# Patient Record
Sex: FEMALE | Race: WHITE | NOT HISPANIC OR LATINO | Employment: FULL TIME | URBAN - METROPOLITAN AREA
[De-identification: names, ages, dates, MRNs, and addresses within clinical notes are randomized per-mention and may not be internally consistent; named-entity substitution may affect disease eponyms.]

---

## 2017-06-02 ENCOUNTER — HOSPITAL ENCOUNTER (OUTPATIENT)
Dept: OTHER | Facility: HOSPITAL | Age: 52
Discharge: HOME | End: 2017-06-02
Attending: FAMILY MEDICINE | Admitting: FAMILY MEDICINE

## 2018-11-20 ENCOUNTER — HOSPITAL ENCOUNTER (OUTPATIENT)
Dept: OTHER | Facility: HOSPITAL | Age: 53
Discharge: HOME | End: 2018-11-20
Attending: FAMILY MEDICINE | Admitting: FAMILY MEDICINE

## 2019-11-12 ENCOUNTER — OFFICE VISIT (OUTPATIENT)
Dept: FAMILY MEDICINE CLINIC | Facility: CLINIC | Age: 54
End: 2019-11-12

## 2019-11-12 VITALS
SYSTOLIC BLOOD PRESSURE: 139 MMHG | WEIGHT: 124.6 LBS | DIASTOLIC BLOOD PRESSURE: 81 MMHG | TEMPERATURE: 98.5 F | HEIGHT: 62 IN | OXYGEN SATURATION: 98 % | RESPIRATION RATE: 18 BRPM | HEART RATE: 70 BPM | BODY MASS INDEX: 22.93 KG/M2

## 2019-11-12 DIAGNOSIS — F43.9 SITUATIONAL STRESS: ICD-10-CM

## 2019-11-12 DIAGNOSIS — Z12.4 PAP SMEAR FOR CERVICAL CANCER SCREENING: ICD-10-CM

## 2019-11-12 DIAGNOSIS — I34.0 NONRHEUMATIC MITRAL VALVE REGURGITATION: ICD-10-CM

## 2019-11-12 DIAGNOSIS — N60.19 FIBROCYSTIC BREAST DISEASE (FCBD), UNSPECIFIED LATERALITY: ICD-10-CM

## 2019-11-12 DIAGNOSIS — N95.1 MENOPAUSAL SYNDROME: ICD-10-CM

## 2019-11-12 DIAGNOSIS — R87.610 ASCUS OF CERVIX WITH NEGATIVE HIGH RISK HPV: ICD-10-CM

## 2019-11-12 DIAGNOSIS — N95.1 PERIMENOPAUSAL: ICD-10-CM

## 2019-11-12 DIAGNOSIS — Z00.00 ENCOUNTER FOR ROUTINE HISTORY AND PHYSICAL EXAM IN FEMALE: ICD-10-CM

## 2019-11-12 DIAGNOSIS — E78.2 HYPERLIPEMIA, MIXED: ICD-10-CM

## 2019-11-12 DIAGNOSIS — N72 CERVICAL INFLAMMATION: ICD-10-CM

## 2019-11-12 DIAGNOSIS — M85.80 OSTEOPENIA, UNSPECIFIED LOCATION: ICD-10-CM

## 2019-11-12 DIAGNOSIS — Z12.31 VISIT FOR SCREENING MAMMOGRAM: ICD-10-CM

## 2019-11-12 DIAGNOSIS — N92.6 IRREGULAR MENSES: ICD-10-CM

## 2019-11-12 DIAGNOSIS — I78.1 SPIDER VEINS: ICD-10-CM

## 2019-11-12 DIAGNOSIS — K21.9 GASTROESOPHAGEAL REFLUX DISEASE, ESOPHAGITIS PRESENCE NOT SPECIFIED: ICD-10-CM

## 2019-11-12 DIAGNOSIS — Z82.49 FAMILY HISTORY OF BLOOD CLOTS: ICD-10-CM

## 2019-11-12 DIAGNOSIS — K58.1 IRRITABLE BOWEL SYNDROME WITH CONSTIPATION: Primary | ICD-10-CM

## 2019-11-12 DIAGNOSIS — K59.00 CONSTIPATION, UNSPECIFIED CONSTIPATION TYPE: ICD-10-CM

## 2019-11-12 PROBLEM — K58.9 IRRITABLE BOWEL DISEASE: Status: ACTIVE | Noted: 2019-11-12

## 2019-11-12 PROCEDURE — 99214 OFFICE O/P EST MOD 30 MIN: CPT | Performed by: FAMILY MEDICINE

## 2019-11-12 PROCEDURE — 99396 PREV VISIT EST AGE 40-64: CPT | Performed by: FAMILY MEDICINE

## 2019-11-12 RX ORDER — PHENOL 1.4 %
600 AEROSOL, SPRAY (ML) MUCOUS MEMBRANE DAILY
COMMUNITY
End: 2020-02-04

## 2019-11-12 RX ORDER — ASCORBIC ACID 500 MG
500 TABLET ORAL DAILY
COMMUNITY

## 2019-11-12 NOTE — ASSESSMENT & PLAN NOTE
Encouraged to do self breast and self derm exams.  Congratulated on using seat belts.  Encouraged to do annual physical exams. Immunizations status reviewed.

## 2019-11-12 NOTE — ASSESSMENT & PLAN NOTE
Labs done 3-4-19, read by me, reviewed with pt.  Trig. 76 down from 77, Tot. Chol. 195 up from 192, HDL 89 up from 79, LDL 89 down from 98. Stable. Advised Ct calcium screening, pt. Given information to schedule with [facility].  Encouraged to watch fatty intake, exercise more, and lose weight. N  No medication  Is getting adequate diet and exercise  Goals developed at last visit were met   Follow up in 12 months  Care management needs are self-addressed.  Self-management abilities addressed and patient is capable of managing her own disease.

## 2019-11-12 NOTE — PROGRESS NOTES
Subjective   Massiel Borja is a 54 y.o. female here for her annual physical with me. Massiel is here for coordination of medical care, to discuss health maintenance, disease prevention as well as to followup on medical problems. Patient has been followed by me since . Patient's last CPE was 11-5-18. Activity level is minimal. Exercises 0 per week. Appetite is good. Feels fairly well with few complaints. Energy level is good. Sleeps fairly well. Patient's last colonoscopy was 10-2-17. She is advised to repeat in 10 years. Patient's last mammogram was 11-20-18. Patient is doing routine self skin exam monthly. Patient is doing routine self-breast exams monthly.  Last Pap 11-5-18    Menstrual Problem   This is a chronic problem. The current episode started more than 1 year ago. The problem occurs constantly. Pertinent negatives include no abdominal pain, chest pain, chills, congestion, coughing, fatigue, fever, joint swelling, myalgias, nausea, neck pain, rash, sore throat, vomiting or weakness. Nothing aggravates the symptoms. She has tried nothing for the symptoms. The treatment provided no relief.   Heartburn   She reports no abdominal pain, no chest pain, no coughing, no nausea, no sore throat or no wheezing. This is a chronic problem. The current episode started more than 1 year ago. The problem occurs occasionally. The problem has been unchanged. The symptoms are aggravated by certain foods. Pertinent negatives include no fatigue or weight loss. There are no known risk factors. She has tried nothing for the symptoms. The treatment provided moderate relief.   Hyperlipidemia   This is a chronic problem. The current episode started more than 1 year ago. The problem is controlled. Recent lipid tests were reviewed and are normal. Factors aggravating her hyperlipidemia include fatty foods. Pertinent negatives include no chest pain, myalgias or shortness of breath. She is currently on no antihyperlipidemic treatment.  The current treatment provides significant improvement of lipids. There are no compliance problems.  Risk factors for coronary artery disease include dyslipidemia.   Constipation   This is a chronic problem. The current episode started more than 1 year ago. The problem is unchanged. The patient is not on a high fiber diet. She exercises regularly. There has been adequate water intake. Pertinent negatives include no abdominal pain, back pain, diarrhea, difficulty urinating, fever, nausea, rectal pain, vomiting or weight loss. She has tried fiber for the symptoms. The treatment provided significant relief.        The following portions of the patient's history were reviewed and updated as appropriate: allergies, current medications, past family history, past medical history, past social history, past surgical history and problem list.    Past Medical History:   Diagnosis Date   • Atypical squamous cell changes of undetermined significance (ASCUS) on cervical cytology with negative high risk human papilloma virus (HPV) test result    • GERD (gastroesophageal reflux disease)    • Hyperlipidemia    • Irritable bowel syndrome    • Osteopenia        Family History   Problem Relation Age of Onset   • COPD Mother    • Cancer Father         lung, kidney,   • Other Brother         brother and father-blood clots   • Heart disease Brother    • Stroke Brother    • Diabetes Maternal Aunt    • Diabetes Maternal Uncle    • Stroke Paternal Uncle          at 70   • Other Maternal Grandmother         alzheimers and parkinsons   • Stroke Maternal Grandfather    • Other Paternal Grandfather         alzheimers       Social History     Socioeconomic History   • Marital status: Single     Spouse name: Not on file   • Number of children: Not on file   • Years of education: Not on file   • Highest education level: Not on file   Tobacco Use   • Smoking status: Never Smoker   • Smokeless tobacco: Never Used   Substance and Sexual  Activity   • Alcohol use: Yes     Frequency: 4 or more times a week     Drinks per session: 1 or 2     Binge frequency: Never   • Drug use: No   • Sexual activity: No   Social History Narrative    2 cups of coffee daily.  Not dating.       Past Surgical History:   Procedure Laterality Date   • CERVIX LESION DESTRUCTION  1992   • TUBAL ABDOMINAL LIGATION         Current Outpatient Medications on File Prior to Visit   Medication Sig Dispense Refill   • calcium carbonate (OS-LU) 600 MG tablet Take 600 mg by mouth Daily.     • Cholecalciferol (VITAMIN D3) 125 MCG (5000 UT) capsule capsule Take 5,000 Units by mouth Daily.     • vitamin C (ASCORBIC ACID) 500 MG tablet Take 500 mg by mouth Daily.       No current facility-administered medications on file prior to visit.        No Known Allergies    Review of Systems   Constitutional: Negative for appetite change, chills, fatigue, fever, unexpected weight gain and unexpected weight loss.   HENT: Negative for congestion, dental problem, ear discharge, ear pain, hearing loss, nosebleeds, postnasal drip, rhinorrhea, sinus pressure, sneezing, sore throat, tinnitus and voice change.         Last dental exam-11-8-19, Dr. Yu-doing well   Eyes: Negative for blurred vision, double vision, pain, redness and visual disturbance.        Last eye exam Approx. 2014-advised to follow   Respiratory: Negative for cough, shortness of breath, wheezing and stridor.    Cardiovascular: Negative for chest pain, palpitations and leg swelling.   Gastrointestinal: Positive for constipation and GERD. Negative for abdominal pain, anal bleeding, blood in stool, diarrhea, nausea, rectal pain, vomiting and indigestion.        4 BM weekly   Endocrine: Negative for cold intolerance, heat intolerance, polydipsia, polyphagia and polyuria.   Genitourinary: Positive for menstrual problem. Negative for difficulty urinating, dysuria, frequency, hematuria and urgency.   Musculoskeletal: Negative for back  "pain, joint swelling, myalgias, neck pain and neck stiffness.   Skin: Negative for color change, dry skin and rash.   Neurological: Negative for dizziness, syncope, speech difficulty, weakness, light-headedness, headache and memory problem.   Hematological: Does not bruise/bleed easily.   Psychiatric/Behavioral: Negative for decreased concentration, sleep disturbance, depressed mood and stress. The patient is not nervous/anxious.        Objective   Visit Vitals  /81 (BP Location: Left arm, Patient Position: Sitting, Cuff Size: Adult)   Pulse 70   Temp 98.5 °F (36.9 °C) (Oral)   Resp 18   Ht 156.2 cm (61.5\")   Wt 56.5 kg (124 lb 9.6 oz)   SpO2 98%   BMI 23.16 kg/m²     Physical Exam   Constitutional: She is oriented to person, place, and time. She appears well-developed and well-nourished. No distress.   HENT:   Head: Normocephalic and atraumatic.   Right Ear: Hearing, tympanic membrane, external ear and ear canal normal.   Left Ear: Hearing, tympanic membrane, external ear and ear canal normal.   Nose: Nose normal.   Mouth/Throat: Oropharynx is clear and moist and mucous membranes are normal. No oropharyngeal exudate.   Eyes: Conjunctivae, EOM and lids are normal. Pupils are equal, round, and reactive to light. Right eye exhibits no discharge. Left eye exhibits no discharge. No scleral icterus.   Neck: Normal range of motion and full passive range of motion without pain. Neck supple. No thyromegaly present.   Cardiovascular: Normal rate, regular rhythm and intact distal pulses.   No murmur heard.  Pulses:       Dorsalis pedis pulses are 1+ on the right side, and 1+ on the left side.        Posterior tibial pulses are 0 on the right side, and 0 on the left side.   Pulmonary/Chest: Effort normal and breath sounds normal. No respiratory distress. She has no wheezes. She has no rales. She exhibits no tenderness.   Breast are nodular more on the right than the left   Abdominal: Soft. Bowel sounds are normal. She " exhibits no distension. There is no tenderness. There is no guarding. No hernia.   Genitourinary: Rectum normal, vagina normal and uterus normal. Pelvic exam was performed with patient supine. No vaginal discharge found.   Genitourinary Comments: Cervix irritated 2 bites taken at 7 and 11 o'clock.  Pap smear obtained.   Musculoskeletal: Normal range of motion. She exhibits no edema, tenderness or deformity.   Lymphadenopathy:     She has no cervical adenopathy.   Neurological: She is alert and oriented to person, place, and time. She has normal strength. No cranial nerve deficit or sensory deficit. She exhibits normal muscle tone. Coordination normal.   Skin: Skin is warm and dry. Lesion noted. No rash noted. She is not diaphoretic. No erythema.   Nevus of the left buttock.  Spider veins of the left thigh.   Psychiatric: She has a normal mood and affect. Her behavior is normal. Judgment and thought content normal.       Assessment/Plan   Problem List Items Addressed This Visit        Cardiovascular and Mediastinum    Hyperlipemia, mixed    Current Assessment & Plan     Labs done 3-4-19, read by me, reviewed with pt.  Trig. 76 down from 77, Tot. Chol. 195 up from 192, HDL 89 up from 79, LDL 89 down from 98. Stable. Advised Ct calcium screening, pt. Given information to schedule with [facility].  Encouraged to watch fatty intake, exercise more, and lose weight. N  No medication  Is getting adequate diet and exercise  Goals developed at last visit were met   Follow up in 12 months  Care management needs are self-addressed.  Self-management abilities addressed and patient is capable of managing her own disease.           Mitral insufficiency    Current Assessment & Plan     Pt. Declines Echo at present         Spider veins    Current Assessment & Plan     Stable            Digestive    Constipation    Current Assessment & Plan     Start Metamucil daily, start with 1 teaspoon and gradually work up to 2 tablespoons daily          GERD (gastroesophageal reflux disease)    Current Assessment & Plan     Doing well advised OTC Zantac or Prilosec         Irritable bowel disease - Primary    Current Assessment & Plan     Doing well at present            Musculoskeletal and Integument    Osteopenia    Current Assessment & Plan     Pt. Given order for Dexa scan, will call results to pt.         Relevant Orders    DEXA Bone Density Axial       Genitourinary    ASCUS of cervix with negative high risk HPV    Current Assessment & Plan     Advised yrly pap.  Pap obtained today         Cervical inflammation    Current Assessment & Plan     New dx.  2 bites taken with a mini watts at 7 and 11 o'clock         Relevant Orders    Reference Histopathology    Irregular menses    Current Assessment & Plan     Advised U/S, pt. Declines at present.         Perimenopausal    Current Assessment & Plan     New dx.  Advised Transvaginal U/S, pt. declines            Other    Encounter for routine history and physical exam in female    Overview     Medical records thoroughly reviewed and summarized in emr, since last PE           Current Assessment & Plan     Encouraged to do self breast and self derm exams.  Congratulated on using seat belts.  Encouraged to do annual physical exams. Immunizations status reviewed.           Family history of blood clots    Overview     Father and brother         Current Assessment & Plan     Offered hematologist referral, pt. Declines.         Fibrocystic breast disease    Current Assessment & Plan     Advised mammo and pt. Given order for mammo.         Menopausal syndrome    Current Assessment & Plan     Doing well         Pap smear for cervical cancer screening    Current Assessment & Plan     Pap obtained today.         Relevant Orders    Liquid-based Pap Smear, Diagnostic    RESOLVED: Situational stress    Visit for screening mammogram    Current Assessment & Plan     Pt. Given order for mammo.         Relevant Orders    Mammo  Screening Digital Tomosynthesis Bilateral With CAD               Encouraged to check her skin and breasts monthly. Reviewed exercising regularly, eating a balanced diet, having regular mammograms, immunizations and if due, patient counselled to check with insurance company for coverage;, importance of bone density screening and regularly checking skin and breasts.

## 2019-11-15 LAB
DX ICD CODE: NORMAL
PATH REPORT.FINAL DX SPEC: NORMAL
PATH REPORT.GROSS SPEC: NORMAL
PATH REPORT.RELEVANT HX SPEC: NORMAL
PATH REPORT.SITE OF ORIGIN SPEC: NORMAL
PATHOLOGIST NAME: NORMAL
PAYMENT PROCEDURE: NORMAL

## 2019-11-16 LAB
CONV .: NORMAL
CYTOLOGIST CVX/VAG CYTO: NORMAL
CYTOLOGY CVX/VAG DOC CYTO: NORMAL
HIV 1 & 2 AB SER-IMP: NORMAL
OTHER STN SPEC: NORMAL
STAT OF ADQ CVX/VAG CYTO-IMP: NORMAL

## 2019-11-20 ENCOUNTER — HOSPITAL ENCOUNTER (OUTPATIENT)
Dept: MAMMOGRAPHY | Facility: HOSPITAL | Age: 54
Discharge: HOME | End: 2019-11-20
Admitting: FAMILY MEDICINE

## 2019-11-20 ENCOUNTER — HOSPITAL ENCOUNTER (OUTPATIENT)
Dept: BONE DENSITY | Facility: HOSPITAL | Age: 54
Discharge: HOME | End: 2019-11-20

## 2019-11-20 DIAGNOSIS — Z12.31 VISIT FOR SCREENING MAMMOGRAM: ICD-10-CM

## 2019-11-20 DIAGNOSIS — M85.80 OSTEOPENIA, UNSPECIFIED LOCATION: ICD-10-CM

## 2019-11-20 PROCEDURE — 77063 BREAST TOMOSYNTHESIS BI: CPT

## 2019-11-20 PROCEDURE — 77080 DXA BONE DENSITY AXIAL: CPT

## 2019-11-20 PROCEDURE — 77067 SCR MAMMO BI INCL CAD: CPT

## 2019-11-27 DIAGNOSIS — N63.20 LEFT BREAST MASS: Primary | ICD-10-CM

## 2019-12-20 ENCOUNTER — HOSPITAL ENCOUNTER (OUTPATIENT)
Dept: ULTRASOUND IMAGING | Facility: HOSPITAL | Age: 54
Discharge: HOME | End: 2019-12-20

## 2019-12-20 ENCOUNTER — HOSPITAL ENCOUNTER (OUTPATIENT)
Dept: MAMMOGRAPHY | Facility: HOSPITAL | Age: 54
Discharge: HOME | End: 2019-12-20
Admitting: FAMILY MEDICINE

## 2019-12-20 DIAGNOSIS — N63.20 LEFT BREAST MASS: ICD-10-CM

## 2019-12-20 DIAGNOSIS — R92.8 ABNORMALITY OF LEFT BREAST ON SCREENING MAMMOGRAM: ICD-10-CM

## 2019-12-20 PROCEDURE — 76642 ULTRASOUND BREAST LIMITED: CPT

## 2019-12-20 PROCEDURE — G0279 TOMOSYNTHESIS, MAMMO: HCPCS

## 2019-12-20 PROCEDURE — 77065 DX MAMMO INCL CAD UNI: CPT

## 2020-01-30 ENCOUNTER — TELEPHONE (OUTPATIENT)
Dept: FAMILY MEDICINE CLINIC | Facility: CLINIC | Age: 55
End: 2020-01-30

## 2020-01-30 NOTE — TELEPHONE ENCOUNTER
Patient left a message that she thinks her leaky valve is acting up. She states that she is having some heart fluttering. I encouraged patient to go to the ER but she doesn't feel like she needs to go at this time.  Scheduled an appt with Dr. Lopez next week.

## 2020-02-04 ENCOUNTER — OFFICE VISIT (OUTPATIENT)
Dept: FAMILY MEDICINE CLINIC | Facility: CLINIC | Age: 55
End: 2020-02-04

## 2020-02-04 VITALS
RESPIRATION RATE: 18 BRPM | BODY MASS INDEX: 23.59 KG/M2 | HEIGHT: 62 IN | OXYGEN SATURATION: 99 % | TEMPERATURE: 98.6 F | WEIGHT: 128.2 LBS | HEART RATE: 83 BPM | SYSTOLIC BLOOD PRESSURE: 114 MMHG | DIASTOLIC BLOOD PRESSURE: 60 MMHG

## 2020-02-04 DIAGNOSIS — R07.89 OTHER CHEST PAIN: ICD-10-CM

## 2020-02-04 DIAGNOSIS — K21.9 GASTROESOPHAGEAL REFLUX DISEASE WITHOUT ESOPHAGITIS: ICD-10-CM

## 2020-02-04 DIAGNOSIS — R06.00 DYSPNEA, UNSPECIFIED TYPE: Primary | ICD-10-CM

## 2020-02-04 PROCEDURE — 93000 ELECTROCARDIOGRAM COMPLETE: CPT | Performed by: FAMILY MEDICINE

## 2020-02-04 PROCEDURE — 99214 OFFICE O/P EST MOD 30 MIN: CPT | Performed by: FAMILY MEDICINE

## 2020-02-04 RX ORDER — PANTOPRAZOLE SODIUM 40 MG/1
40 TABLET, DELAYED RELEASE ORAL DAILY
Qty: 30 TABLET | Refills: 5 | Status: SHIPPED | OUTPATIENT
Start: 2020-02-04 | End: 2023-01-09

## 2020-02-04 NOTE — ASSESSMENT & PLAN NOTE
Intermittent-   Probably due to costochondritis or mild arrythmia.  EKG done today and read by myself shows normal sinus rhythm with nonspecific changes.  Will schedule a stress echo.   If sx change or increase return Immediately

## 2020-02-04 NOTE — PROGRESS NOTES
Subjective   Massiel Borja is a 54 y.o. female.     Palpitations    This is a recurrent problem. The current episode started more than 1 month ago. The problem occurs intermittently. The problem has been waxing and waning. Associated symptoms include chest pain and shortness of breath. Pertinent negatives include no diaphoresis or nausea. She has tried nothing for the symptoms. mitral valve insufficiency   Chest Pain    This is a recurrent problem. The current episode started in the past 7 days. The onset quality is gradual. The problem has been gradually worsening. The pain is present in the epigastric region. The quality of the pain is described as burning. The pain does not radiate. Associated symptoms include palpitations and shortness of breath. Pertinent negatives include no diaphoresis or nausea. Past medical history comments: mitral valve insufficiency        The following portions of the patient's history were reviewed and updated as appropriate: allergies, current medications, past family history, past medical history, past social history, past surgical history and problem list.    Family History   Problem Relation Age of Onset   • COPD Mother    • Cancer Father         lung, kidney,   • Other Brother         brother and father-blood clots   • Heart disease Brother    • Stroke Brother    • Diabetes Maternal Aunt    • Diabetes Maternal Uncle    • Stroke Paternal Uncle          at 70   • Other Maternal Grandmother         alzheimers and parkinsons   • Stroke Maternal Grandfather    • Other Paternal Grandfather         alzheimers       Social History     Tobacco Use   • Smoking status: Never Smoker   • Smokeless tobacco: Never Used   Substance Use Topics   • Alcohol use: Yes     Frequency: 4 or more times a week     Drinks per session: 1 or 2     Binge frequency: Never   • Drug use: No       Past Surgical History:   Procedure Laterality Date   • CERVIX LESION DESTRUCTION     • TUBAL ABDOMINAL  "LIGATION         Patient Active Problem List   Diagnosis   • ASCUS of cervix with negative high risk HPV   • Perimenopausal   • Fibrocystic breast disease   • Hyperlipemia, mixed   • Mitral insufficiency   • Spider veins   • Menopausal syndrome   • Osteopenia   • Irritable bowel disease   • GERD (gastroesophageal reflux disease)   • Visit for screening mammogram   • Irregular menses   • Cervical inflammation   • Family history of blood clots   • Pap smear for cervical cancer screening   • Constipation   • Encounter for routine history and physical exam in female   • Dyspnea   • Other chest pain       Current Outpatient Medications on File Prior to Visit   Medication Sig Dispense Refill   • Cholecalciferol (VITAMIN D3) 125 MCG (5000 UT) capsule capsule Take 5,000 Units by mouth Daily.     • vitamin C (ASCORBIC ACID) 500 MG tablet Take 500 mg by mouth Daily.       No current facility-administered medications on file prior to visit.        No Known Allergies    Review of Systems   Constitutional: Negative for diaphoresis.   Respiratory: Positive for shortness of breath. Negative for wheezing.    Cardiovascular: Positive for chest pain and palpitations.   Gastrointestinal: Positive for GERD. Negative for nausea.       Objective   Visit Vitals  /60 (BP Location: Right arm, Patient Position: Sitting, Cuff Size: Adult)   Pulse 83   Temp 98.6 °F (37 °C) (Oral)   Resp 18   Ht 156.2 cm (61.5\")   Wt 58.2 kg (128 lb 3.2 oz)   SpO2 99%   BMI 23.83 kg/m²     Physical Exam   Constitutional: She is oriented to person, place, and time. She appears well-developed and well-nourished. She is cooperative.   HENT:   Head: Normocephalic.   Neck: Trachea normal. Neck supple. Carotid bruit is not present. No thyromegaly present.   Cardiovascular: Normal rate and regular rhythm. Exam reveals no gallop and no friction rub.   No murmur heard.  Pulmonary/Chest: Effort normal and breath sounds normal. No respiratory distress. She has no " wheezes. She exhibits no tenderness.   Neurological: She is alert and oriented to person, place, and time.   Skin: Skin is dry. No rash noted. Nails show no clubbing.   Nursing note and vitals reviewed.        Assessment/Plan .  Problem List Items Addressed This Visit        Unprioritized    Dyspnea - Primary    Current Assessment & Plan     Intermittent- will order a chest xray. If no improvement will need pulm func         Relevant Orders    Adult Stress Echo W/ Cont or Stress Agent if Necessary Per Protocol    GERD (gastroesophageal reflux disease)    Current Assessment & Plan     Worsening- Advised patient to try Protonix 40mg daily.          Relevant Medications    pantoprazole (PROTONIX) 40 MG EC tablet    Other chest pain    Current Assessment & Plan     Intermittent-   Probably due to costochondritis or mild arrythmia.  EKG done today and read by myself shows normal sinus rhythm with nonspecific changes.  Will schedule a stress echo.   If sx change or increase return Immediately         Relevant Orders    ECG 12 Lead    Adult Stress Echo W/ Cont or Stress Agent if Necessary Per Protocol

## 2020-02-13 ENCOUNTER — APPOINTMENT (OUTPATIENT)
Dept: CARDIOLOGY | Facility: HOSPITAL | Age: 55
End: 2020-02-13

## 2020-02-27 ENCOUNTER — OFFICE VISIT (OUTPATIENT)
Dept: FAMILY MEDICINE CLINIC | Facility: CLINIC | Age: 55
End: 2020-02-27

## 2020-02-27 ENCOUNTER — HOSPITAL ENCOUNTER (OUTPATIENT)
Dept: CARDIOLOGY | Facility: HOSPITAL | Age: 55
Discharge: HOME | End: 2020-02-27
Admitting: FAMILY MEDICINE

## 2020-02-27 VITALS — HEART RATE: 62 BPM | DIASTOLIC BLOOD PRESSURE: 80 MMHG | SYSTOLIC BLOOD PRESSURE: 136 MMHG

## 2020-02-27 VITALS — HEIGHT: 61 IN | WEIGHT: 128 LBS | BODY MASS INDEX: 24.17 KG/M2

## 2020-02-27 DIAGNOSIS — R06.00 DYSPNEA, UNSPECIFIED TYPE: ICD-10-CM

## 2020-02-27 DIAGNOSIS — R07.89 OTHER CHEST PAIN: Primary | ICD-10-CM

## 2020-02-27 DIAGNOSIS — R07.89 OTHER CHEST PAIN: ICD-10-CM

## 2020-02-27 DIAGNOSIS — R00.2 PALPITATIONS: ICD-10-CM

## 2020-02-27 PROCEDURE — 99213 OFFICE O/P EST LOW 20 MIN: CPT | Performed by: FAMILY MEDICINE

## 2020-02-27 PROCEDURE — 93350 STRESS TTE ONLY: CPT

## 2020-02-27 PROCEDURE — 93320 DOPPLER ECHO COMPLETE: CPT

## 2020-02-27 PROCEDURE — 93325 DOPPLER ECHO COLOR FLOW MAPG: CPT

## 2020-02-27 PROCEDURE — 93017 CV STRESS TEST TRACING ONLY: CPT

## 2020-02-27 NOTE — ASSESSMENT & PLAN NOTE
Improved, not resolved.  Stress Echo done today. Stress portion appears normal.   Pt. Will return in 1 week to discuss official results.

## 2020-02-27 NOTE — PROGRESS NOTES
Subjective   Massiel Borja is a 54 y.o. female.     Pt in office to discuss chest pain, palp and stress test done in diagnostics    Chest Pain    This is a recurrent problem. The current episode started in the past 7 days. The onset quality is gradual. The problem has been gradually improving. The pain is present in the epigastric region. The quality of the pain is described as burning. The pain does not radiate. Associated symptoms include weakness. Pertinent negatives include no diaphoresis, fever, nausea, shortness of breath or vomiting. Palpitations: improved. Past medical history comments: mitral valve insufficiency   Palpitations    This is a recurrent problem. The current episode started more than 1 month ago. The problem occurs intermittently. The problem has been gradually improving. Associated symptoms include chest pain (improved) and weakness. Pertinent negatives include no diaphoresis, fever, nausea, shortness of breath or vomiting. She has tried nothing for the symptoms. mitral valve insufficiency        The following portions of the patient's history were reviewed and updated as appropriate: allergies, current medications, past family history, past medical history, past social history, past surgical history and problem list.    Patient Active Problem List   Diagnosis   • ASCUS of cervix with negative high risk HPV   • Perimenopausal   • Fibrocystic breast disease   • Hyperlipemia, mixed   • Mitral insufficiency   • Spider veins   • Menopausal syndrome   • Osteopenia   • Irritable bowel disease   • GERD (gastroesophageal reflux disease)   • Visit for screening mammogram   • Irregular menses   • Cervical inflammation   • Family history of blood clots   • Pap smear for cervical cancer screening   • Constipation   • Encounter for routine history and physical exam in female   • Dyspnea   • Other chest pain   • Palpitations       Current Outpatient Medications on File Prior to Visit   Medication Sig Dispense  Refill   • Cholecalciferol (VITAMIN D3) 125 MCG (5000 UT) capsule capsule Take 5,000 Units by mouth Daily.     • pantoprazole (PROTONIX) 40 MG EC tablet Take 1 tablet by mouth Daily. 30 tablet 5   • vitamin C (ASCORBIC ACID) 500 MG tablet Take 500 mg by mouth Daily.       No current facility-administered medications on file prior to visit.      Current outpatient and discharge medications have been reconciled for the patient.  Reviewed by: Lenin Lopez MD      No Known Allergies    Review of Systems   Constitutional: Negative for diaphoresis, fatigue and fever.   Respiratory: Negative for shortness of breath.    Cardiovascular: Positive for chest pain (improved). Palpitations: improved.   Gastrointestinal: Negative for nausea and vomiting.   Neurological: Positive for weakness.     I have reviewed and confirmed the accuracy of the ROS as documented by the MA/LPN/RN Lenin Lopez MD      Objective   Vitals:    02/27/20 1554   BP: 136/80   Pulse: 62     Physical Exam   Constitutional: She is oriented to person, place, and time. She appears well-developed and well-nourished. She is cooperative.   HENT:   Head: Normocephalic.   Neck: Trachea normal. Neck supple. Carotid bruit is not present. No thyromegaly present.   Cardiovascular: Normal rate and regular rhythm. Exam reveals no gallop and no friction rub.   No murmur heard.  Pulmonary/Chest: Effort normal and breath sounds normal. No respiratory distress. She has no wheezes. She exhibits no tenderness.   Neurological: She is alert and oriented to person, place, and time.   Skin: Skin is dry. No rash noted. Nails show no clubbing.   Nursing note and vitals reviewed.        Assessment/Plan .  Problem List Items Addressed This Visit        Unprioritized    Other chest pain - Primary    Current Assessment & Plan     Improved, not resolved.  Stress Echo done today. Stress portion appears normal.   Pt. Will return in 1 week to discuss official results.          Palpitations    Current Assessment & Plan     Improved, not resolved.  Stress Echo done today. Stress portion appears normal.   Pt. Will return in 1 week to discuss official results.

## 2020-03-03 LAB
BH CV ECHO MEAS - ACS: 1.5 CM
BH CV ECHO MEAS - AO MAX PG (FULL): 2.1 MMHG
BH CV ECHO MEAS - AO MAX PG: 6.5 MMHG
BH CV ECHO MEAS - AO MEAN PG (FULL): 0.77 MMHG
BH CV ECHO MEAS - AO MEAN PG: 3 MMHG
BH CV ECHO MEAS - AO ROOT AREA (BSA CORRECTED): 1.6
BH CV ECHO MEAS - AO ROOT AREA: 4.8 CM^2
BH CV ECHO MEAS - AO ROOT DIAM: 2.5 CM
BH CV ECHO MEAS - AO V2 MAX: 127.6 CM/SEC
BH CV ECHO MEAS - AO V2 MEAN: 80.4 CM/SEC
BH CV ECHO MEAS - AO V2 VTI: 27.5 CM
BH CV ECHO MEAS - AVA(I,A): 2.5 CM^2
BH CV ECHO MEAS - AVA(I,D): 2.5 CM^2
BH CV ECHO MEAS - AVA(V,A): 2.5 CM^2
BH CV ECHO MEAS - AVA(V,D): 2.5 CM^2
BH CV ECHO MEAS - BSA(HAYCOCK): 1.6 M^2
BH CV ECHO MEAS - BSA: 1.6 M^2
BH CV ECHO MEAS - BZI_BMI: 23.8 KILOGRAMS/M^2
BH CV ECHO MEAS - BZI_METRIC_HEIGHT: 156.2 CM
BH CV ECHO MEAS - BZI_METRIC_WEIGHT: 58.1 KG
BH CV ECHO MEAS - EDV(CUBED): 80.3 ML
BH CV ECHO MEAS - EDV(MOD-SP4): 72.4 ML
BH CV ECHO MEAS - EDV(TEICH): 83.7 ML
BH CV ECHO MEAS - EF(CUBED): 73.9 %
BH CV ECHO MEAS - EF(MOD-BP): 65 %
BH CV ECHO MEAS - EF(MOD-SP4): 67.8 %
BH CV ECHO MEAS - EF(TEICH): 66.1 %
BH CV ECHO MEAS - ESV(CUBED): 20.9 ML
BH CV ECHO MEAS - ESV(MOD-SP4): 23.3 ML
BH CV ECHO MEAS - ESV(TEICH): 28.4 ML
BH CV ECHO MEAS - FS: 36.1 %
BH CV ECHO MEAS - IVS/LVPW: 1
BH CV ECHO MEAS - IVSD: 0.88 CM
BH CV ECHO MEAS - LA DIMENSION: 2.3 CM
BH CV ECHO MEAS - LA/AO: 0.94
BH CV ECHO MEAS - LV DIASTOLIC VOL/BSA (35-75): 46.1 ML/M^2
BH CV ECHO MEAS - LV MASS(C)D: 120.2 GRAMS
BH CV ECHO MEAS - LV MASS(C)DI: 76.5 GRAMS/M^2
BH CV ECHO MEAS - LV MAX PG: 4.4 MMHG
BH CV ECHO MEAS - LV MEAN PG: 2.2 MMHG
BH CV ECHO MEAS - LV SYSTOLIC VOL/BSA (12-30): 14.8 ML/M^2
BH CV ECHO MEAS - LV V1 MAX: 105 CM/SEC
BH CV ECHO MEAS - LV V1 MEAN: 69.2 CM/SEC
BH CV ECHO MEAS - LV V1 VTI: 22.7 CM
BH CV ECHO MEAS - LVIDD: 4.3 CM
BH CV ECHO MEAS - LVIDS: 2.8 CM
BH CV ECHO MEAS - LVOT AREA: 3 CM^2
BH CV ECHO MEAS - LVOT DIAM: 2 CM
BH CV ECHO MEAS - LVPWD: 0.88 CM
BH CV ECHO MEAS - MR MAX PG: 127.4 MMHG
BH CV ECHO MEAS - MR MAX VEL: 564.2 CM/SEC
BH CV ECHO MEAS - MV A MAX VEL: 60.5 CM/SEC
BH CV ECHO MEAS - MV E MAX VEL: 80.1 CM/SEC
BH CV ECHO MEAS - MV E/A: 1.3
BH CV ECHO MEAS - MV MAX PG: 3.7 MMHG
BH CV ECHO MEAS - MV MEAN PG: 1.1 MMHG
BH CV ECHO MEAS - MV V2 MAX: 96.4 CM/SEC
BH CV ECHO MEAS - MV V2 MEAN: 48.3 CM/SEC
BH CV ECHO MEAS - MV V2 VTI: 25.9 CM
BH CV ECHO MEAS - MVA(VTI): 2.6 CM^2
BH CV ECHO MEAS - PA ACC TIME: 0.11 SEC
BH CV ECHO MEAS - PA MAX PG: 8.7 MMHG
BH CV ECHO MEAS - PA PR(ACCEL): 30.2 MMHG
BH CV ECHO MEAS - PA V2 MAX: 147.3 CM/SEC
BH CV ECHO MEAS - PI END-D VEL: 107.8 CM/SEC
BH CV ECHO MEAS - RAP SYSTOLE: 10 MMHG
BH CV ECHO MEAS - RVDD: 1.6 CM
BH CV ECHO MEAS - RVSP: 38.4 MMHG
BH CV ECHO MEAS - SI(AO): 83.2 ML/M^2
BH CV ECHO MEAS - SI(CUBED): 37.8 ML/M^2
BH CV ECHO MEAS - SI(LVOT): 43.4 ML/M^2
BH CV ECHO MEAS - SI(MOD-SP4): 31.2 ML/M^2
BH CV ECHO MEAS - SI(TEICH): 35.2 ML/M^2
BH CV ECHO MEAS - SV(AO): 130.8 ML
BH CV ECHO MEAS - SV(CUBED): 59.4 ML
BH CV ECHO MEAS - SV(LVOT): 68.2 ML
BH CV ECHO MEAS - SV(MOD-SP4): 49.1 ML
BH CV ECHO MEAS - SV(TEICH): 55.3 ML
BH CV ECHO MEAS - TR MAX VEL: 227.5 CM/SEC
BH CV STRESS BP STAGE 1: NORMAL
BH CV STRESS BP STAGE 2: NORMAL
BH CV STRESS BP STAGE 3: NORMAL
BH CV STRESS BP STAGE 4: NORMAL
BH CV STRESS DURATION MIN STAGE 1: 3
BH CV STRESS DURATION MIN STAGE 2: 3
BH CV STRESS DURATION MIN STAGE 3: 3
BH CV STRESS DURATION MIN STAGE 4: 3
BH CV STRESS DURATION SEC STAGE 1: 0
BH CV STRESS DURATION SEC STAGE 2: 0
BH CV STRESS DURATION SEC STAGE 3: 0
BH CV STRESS DURATION SEC STAGE 4: 0
BH CV STRESS ECHO POST STRESS EJECTION FRACTION EF: 75 %
BH CV STRESS GRADE STAGE 1: 10
BH CV STRESS GRADE STAGE 2: 12
BH CV STRESS GRADE STAGE 3: 14
BH CV STRESS GRADE STAGE 4: 16
BH CV STRESS HR STAGE 1: 109
BH CV STRESS HR STAGE 2: 122
BH CV STRESS HR STAGE 3: 139
BH CV STRESS HR STAGE 4: 173
BH CV STRESS METS STAGE 1: 5
BH CV STRESS METS STAGE 2: 7.5
BH CV STRESS METS STAGE 3: 10
BH CV STRESS METS STAGE 4: 13.5
BH CV STRESS PROTOCOL 1: NORMAL
BH CV STRESS RECOVERY BP: NORMAL MMHG
BH CV STRESS RECOVERY HR: 91 BPM
BH CV STRESS SPEED STAGE 1: 1.7
BH CV STRESS SPEED STAGE 2: 2.5
BH CV STRESS SPEED STAGE 3: 3.4
BH CV STRESS SPEED STAGE 4: 4.2
BH CV STRESS STAGE 1: 1
BH CV STRESS STAGE 2: 2
BH CV STRESS STAGE 3: 3
BH CV STRESS STAGE 4: 4
MAXIMAL PREDICTED HEART RATE: 168 BPM
PERCENT MAX PREDICTED HR: 103.57 %
STRESS BASELINE BP: NORMAL MMHG
STRESS BASELINE HR: 62 BPM
STRESS PERCENT HR: 122 %
STRESS POST EXERCISE DUR MIN: 12 MIN
STRESS POST EXERCISE DUR SEC: 4 SEC
STRESS POST PEAK BP: NORMAL MMHG
STRESS POST PEAK HR: 174 BPM
STRESS TARGET HR: 143 BPM

## 2020-03-03 PROCEDURE — 93320 DOPPLER ECHO COMPLETE: CPT | Performed by: INTERNAL MEDICINE

## 2020-03-03 PROCEDURE — 93018 CV STRESS TEST I&R ONLY: CPT | Performed by: INTERNAL MEDICINE

## 2020-03-03 PROCEDURE — 93325 DOPPLER ECHO COLOR FLOW MAPG: CPT | Performed by: INTERNAL MEDICINE

## 2020-03-03 PROCEDURE — 93350 STRESS TTE ONLY: CPT | Performed by: INTERNAL MEDICINE

## 2020-03-05 ENCOUNTER — OFFICE VISIT (OUTPATIENT)
Dept: FAMILY MEDICINE CLINIC | Facility: CLINIC | Age: 55
End: 2020-03-05

## 2020-03-05 VITALS
HEART RATE: 67 BPM | WEIGHT: 126.2 LBS | SYSTOLIC BLOOD PRESSURE: 122 MMHG | BODY MASS INDEX: 23.22 KG/M2 | OXYGEN SATURATION: 99 % | TEMPERATURE: 98.6 F | RESPIRATION RATE: 16 BRPM | DIASTOLIC BLOOD PRESSURE: 62 MMHG | HEIGHT: 62 IN

## 2020-03-05 DIAGNOSIS — R00.2 PALPITATIONS: ICD-10-CM

## 2020-03-05 DIAGNOSIS — R07.89 OTHER CHEST PAIN: ICD-10-CM

## 2020-03-05 DIAGNOSIS — R06.00 DYSPNEA, UNSPECIFIED TYPE: Primary | ICD-10-CM

## 2020-03-05 DIAGNOSIS — K21.9 GASTROESOPHAGEAL REFLUX DISEASE, ESOPHAGITIS PRESENCE NOT SPECIFIED: ICD-10-CM

## 2020-03-05 PROCEDURE — 99214 OFFICE O/P EST MOD 30 MIN: CPT | Performed by: FAMILY MEDICINE

## 2020-03-05 NOTE — PROGRESS NOTES
Subjective   Massiel Borja is a 54 y.o. female.     Follow up Diagnostic study:  Massiel was seen on 2020 for and Echocardiogram. She is here today to discuss the results.  Chest Pain    This is a recurrent problem. The current episode started more than 1 year ago. The onset quality is gradual. The problem occurs intermittently. The problem has been gradually improving. The pain is at a severity of 1/10. The quality of the pain is described as tightness. Associated symptoms include palpitations. Pertinent negatives include no diaphoresis, dizziness, nausea, numbness, shortness of breath or weakness.   Palpitations    This is a recurrent problem. The current episode started more than 1 year ago. The problem occurs intermittently. The problem has been gradually improving. The symptoms are aggravated by caffeine. Associated symptoms include chest pain. Pertinent negatives include no anxiety, diaphoresis, dizziness, nausea, numbness, shortness of breath or weakness.   Heartburn   She complains of chest pain and heartburn. She reports no dysphagia or no nausea. This is a recurrent problem. The current episode started more than 1 year ago. The problem occurs occasionally. The problem has been rapidly improving (improved with proton pump). The heartburn duration is several minutes.        The following portions of the patient's history were reviewed and updated as appropriate: allergies, current medications, past family history, past medical history, past social history, past surgical history and problem list.    Family History   Problem Relation Age of Onset   • COPD Mother    • Cancer Father         lung, kidney,   • Other Brother         brother and father-blood clots   • Heart disease Brother    • Stroke Brother    • Diabetes Maternal Aunt    • Diabetes Maternal Uncle    • Stroke Paternal Uncle          at 70   • Other Maternal Grandmother         alzheimers and parkinsons   • Stroke Maternal Grandfather     • Other Paternal Grandfather         alzheimers       Social History     Tobacco Use   • Smoking status: Never Smoker   • Smokeless tobacco: Never Used   Substance Use Topics   • Alcohol use: Yes     Frequency: 4 or more times a week     Drinks per session: 1 or 2     Binge frequency: Never   • Drug use: No       Past Surgical History:   Procedure Laterality Date   • CERVIX LESION DESTRUCTION  1992   • TUBAL ABDOMINAL LIGATION         Patient Active Problem List   Diagnosis   • ASCUS of cervix with negative high risk HPV   • Perimenopausal   • Fibrocystic breast disease   • Hyperlipemia, mixed   • Mitral insufficiency   • Spider veins   • Menopausal syndrome   • Osteopenia   • Irritable bowel disease   • GERD (gastroesophageal reflux disease)   • Visit for screening mammogram   • Irregular menses   • Cervical inflammation   • Family history of blood clots   • Pap smear for cervical cancer screening   • Constipation   • Encounter for routine history and physical exam in female   • Dyspnea   • Other chest pain   • Palpitations       Current Outpatient Medications on File Prior to Visit   Medication Sig Dispense Refill   • Cholecalciferol (VITAMIN D3) 125 MCG (5000 UT) capsule capsule Take 5,000 Units by mouth Daily.     • vitamin C (ASCORBIC ACID) 500 MG tablet Take 500 mg by mouth Daily.     • pantoprazole (PROTONIX) 40 MG EC tablet Take 1 tablet by mouth Daily. 30 tablet 5     No current facility-administered medications on file prior to visit.        No Known Allergies    Review of Systems   Constitutional: Negative for diaphoresis.   Respiratory: Negative for shortness of breath.    Cardiovascular: Positive for chest pain and palpitations.   Gastrointestinal: Negative for dysphagia and nausea.   Neurological: Negative for dizziness, syncope, weakness, light-headedness and numbness.   Psychiatric/Behavioral: The patient is not nervous/anxious.        Objective   Visit Vitals  /62 (BP Location: Right arm,  "Patient Position: Sitting, Cuff Size: Adult)   Pulse 67   Temp 98.6 °F (37 °C) (Oral)   Resp 16   Ht 156.2 cm (61.5\")   Wt 57.2 kg (126 lb 3.2 oz)   SpO2 99% Comment: Room air   BMI 23.46 kg/m²     Physical Exam   Constitutional: She is oriented to person, place, and time. She appears well-developed and well-nourished. She is cooperative.   HENT:   Head: Normocephalic.   Neck: Trachea normal. Neck supple. Carotid bruit is not present. No thyromegaly present.   Cardiovascular: Normal rate and regular rhythm. Exam reveals no gallop and no friction rub.   No murmur heard.  Pulmonary/Chest: Effort normal and breath sounds normal. No respiratory distress. She has no wheezes. She exhibits no tenderness.   Neurological: She is alert and oriented to person, place, and time.   Skin: Skin is dry. No rash noted. Nails show no clubbing.   Nursing note and vitals reviewed.        Assessment/Plan .  Problem List Items Addressed This Visit        Unprioritized    Dyspnea - Primary    Current Assessment & Plan     resolved         GERD (gastroesophageal reflux disease)    Current Assessment & Plan     Insurance would not pay for protonix thus she is using pepcid which does excellent.  She also feels this was causing her chest pain         Other chest pain    Current Assessment & Plan     ? Due to gerd Greatly improved with protonix -- advised stress echo was nl         Palpitations    Current Assessment & Plan     Greatly improved - stress echo is nl                    "

## 2020-03-06 NOTE — ASSESSMENT & PLAN NOTE
Insurance would not pay for protonix thus she is using pepcid which does excellent.  She also feels this was causing her chest pain

## 2020-06-08 DIAGNOSIS — N63.20 LEFT BREAST MASS: Primary | ICD-10-CM

## 2020-08-24 ENCOUNTER — TELEPHONE (OUTPATIENT)
Dept: FAMILY MEDICINE CLINIC | Facility: CLINIC | Age: 55
End: 2020-08-24

## 2020-09-03 DIAGNOSIS — N63.20 LEFT BREAST MASS: Primary | ICD-10-CM

## 2020-09-24 ENCOUNTER — HOSPITAL ENCOUNTER (OUTPATIENT)
Dept: MAMMOGRAPHY | Facility: HOSPITAL | Age: 55
Discharge: HOME | End: 2020-09-24

## 2020-09-24 ENCOUNTER — HOSPITAL ENCOUNTER (OUTPATIENT)
Dept: ULTRASOUND IMAGING | Facility: HOSPITAL | Age: 55
Discharge: HOME | End: 2020-09-24

## 2020-09-24 DIAGNOSIS — N63.20 LEFT BREAST MASS: ICD-10-CM

## 2020-09-24 PROCEDURE — G0279 TOMOSYNTHESIS, MAMMO: HCPCS

## 2020-09-24 PROCEDURE — 77065 DX MAMMO INCL CAD UNI: CPT

## 2020-09-24 PROCEDURE — 76642 ULTRASOUND BREAST LIMITED: CPT

## 2020-11-30 ENCOUNTER — OFFICE VISIT (OUTPATIENT)
Dept: FAMILY MEDICINE CLINIC | Facility: CLINIC | Age: 55
End: 2020-11-30

## 2020-11-30 VITALS
WEIGHT: 133.6 LBS | SYSTOLIC BLOOD PRESSURE: 116 MMHG | OXYGEN SATURATION: 97 % | RESPIRATION RATE: 16 BRPM | HEIGHT: 62 IN | DIASTOLIC BLOOD PRESSURE: 60 MMHG | BODY MASS INDEX: 24.59 KG/M2 | HEART RATE: 96 BPM | TEMPERATURE: 97.3 F

## 2020-11-30 DIAGNOSIS — N95.1 PERIMENOPAUSAL: ICD-10-CM

## 2020-11-30 DIAGNOSIS — K59.00 CONSTIPATION, UNSPECIFIED CONSTIPATION TYPE: ICD-10-CM

## 2020-11-30 DIAGNOSIS — K58.1 IRRITABLE BOWEL SYNDROME WITH CONSTIPATION: ICD-10-CM

## 2020-11-30 DIAGNOSIS — I34.0 NONRHEUMATIC MITRAL VALVE REGURGITATION: ICD-10-CM

## 2020-11-30 DIAGNOSIS — Z00.00 ANNUAL PHYSICAL EXAM: ICD-10-CM

## 2020-11-30 DIAGNOSIS — N60.02 BENIGN CYST OF LEFT BREAST: ICD-10-CM

## 2020-11-30 DIAGNOSIS — R87.610 ASCUS OF CERVIX WITH NEGATIVE HIGH RISK HPV: ICD-10-CM

## 2020-11-30 DIAGNOSIS — K21.9 GASTROESOPHAGEAL REFLUX DISEASE, UNSPECIFIED WHETHER ESOPHAGITIS PRESENT: ICD-10-CM

## 2020-11-30 DIAGNOSIS — M85.80 OSTEOPENIA, UNSPECIFIED LOCATION: ICD-10-CM

## 2020-11-30 DIAGNOSIS — N95.0 POSTMENOPAUSAL BLEEDING: ICD-10-CM

## 2020-11-30 DIAGNOSIS — I78.1 SPIDER VEINS: ICD-10-CM

## 2020-11-30 DIAGNOSIS — N95.1 MENOPAUSAL SYNDROME: ICD-10-CM

## 2020-11-30 DIAGNOSIS — E78.2 HYPERLIPEMIA, MIXED: ICD-10-CM

## 2020-11-30 DIAGNOSIS — N60.19 FIBROCYSTIC BREAST DISEASE (FCBD), UNSPECIFIED LATERALITY: ICD-10-CM

## 2020-11-30 DIAGNOSIS — N72 CERVICAL INFLAMMATION: ICD-10-CM

## 2020-11-30 DIAGNOSIS — N95.0 POSTMENOPAUSE BLEEDING: Primary | ICD-10-CM

## 2020-11-30 DIAGNOSIS — Z12.4 PAP SMEAR FOR CERVICAL CANCER SCREENING: ICD-10-CM

## 2020-11-30 DIAGNOSIS — Z00.00 ENCOUNTER FOR ROUTINE HISTORY AND PHYSICAL EXAM IN FEMALE: ICD-10-CM

## 2020-11-30 DIAGNOSIS — Z82.49 FAMILY HISTORY OF BLOOD CLOTS: ICD-10-CM

## 2020-11-30 PROCEDURE — 99214 OFFICE O/P EST MOD 30 MIN: CPT | Performed by: FAMILY MEDICINE

## 2020-11-30 PROCEDURE — 99396 PREV VISIT EST AGE 40-64: CPT | Performed by: FAMILY MEDICINE

## 2020-12-04 LAB
CYTOLOGIST CVX/VAG CYTO: ABNORMAL
CYTOLOGY CVX/VAG DOC CYTO: ABNORMAL
CYTOLOGY CVX/VAG DOC THIN PREP: ABNORMAL
DX ICD CODE: ABNORMAL
DX ICD CODE: ABNORMAL
HIV 1 & 2 AB SER-IMP: ABNORMAL
HPV I/H RISK 4 DNA CVX QL PROBE+SIG AMP: POSITIVE
OTHER STN SPEC: ABNORMAL
PATHOLOGIST CVX/VAG CYTO: ABNORMAL
RECOM F/U CVX/VAG CYTO: ABNORMAL
STAT OF ADQ CVX/VAG CYTO-IMP: ABNORMAL

## 2020-12-06 PROBLEM — R06.00 DYSPNEA: Status: RESOLVED | Noted: 2020-02-04 | Resolved: 2020-12-06

## 2020-12-06 PROBLEM — R07.89 OTHER CHEST PAIN: Status: RESOLVED | Noted: 2020-02-04 | Resolved: 2020-12-06

## 2020-12-06 PROBLEM — R00.2 PALPITATIONS: Status: RESOLVED | Noted: 2020-02-27 | Resolved: 2020-12-06

## 2020-12-06 PROBLEM — N60.02 BENIGN CYST OF LEFT BREAST: Status: ACTIVE | Noted: 2020-12-06

## 2020-12-17 ENCOUNTER — HOSPITAL ENCOUNTER (OUTPATIENT)
Dept: BONE DENSITY | Facility: HOSPITAL | Age: 55
Discharge: HOME | End: 2020-12-17

## 2020-12-17 ENCOUNTER — HOSPITAL ENCOUNTER (OUTPATIENT)
Dept: ULTRASOUND IMAGING | Facility: HOSPITAL | Age: 55
Discharge: HOME | End: 2020-12-17

## 2020-12-17 DIAGNOSIS — N95.0 POSTMENOPAUSE BLEEDING: ICD-10-CM

## 2020-12-17 DIAGNOSIS — M85.80 OSTEOPENIA, UNSPECIFIED LOCATION: ICD-10-CM

## 2020-12-17 PROCEDURE — 93976 VASCULAR STUDY: CPT

## 2020-12-17 PROCEDURE — 76830 TRANSVAGINAL US NON-OB: CPT

## 2020-12-17 PROCEDURE — 77080 DXA BONE DENSITY AXIAL: CPT

## 2020-12-17 PROCEDURE — 76856 US EXAM PELVIC COMPLETE: CPT

## 2020-12-23 ENCOUNTER — PROCEDURE VISIT (OUTPATIENT)
Dept: FAMILY MEDICINE CLINIC | Facility: CLINIC | Age: 55
End: 2020-12-23

## 2020-12-23 DIAGNOSIS — R87.610 ATYPICAL SQUAMOUS CELL CHANGES OF UNDETERMINED SIGNIFICANCE (ASCUS) ON CERVICAL CYTOLOGY WITH NEGATIVE HIGH RISK HUMAN PAPILLOMA VIRUS (HPV) TEST RESULT: Primary | ICD-10-CM

## 2020-12-23 DIAGNOSIS — N95.0 POSTMENOPAUSAL BLEEDING: ICD-10-CM

## 2020-12-23 PROBLEM — IMO0002 BORDERLINE OPEN-ANGLE GLAUCOMA: Status: ACTIVE | Noted: 2020-02-21

## 2020-12-23 PROCEDURE — 58100 BIOPSY OF UTERUS LINING: CPT | Performed by: FAMILY MEDICINE

## 2020-12-30 ENCOUNTER — TELEPHONE (OUTPATIENT)
Dept: FAMILY MEDICINE CLINIC | Facility: CLINIC | Age: 55
End: 2020-12-30

## 2021-01-03 NOTE — PROGRESS NOTES
Subjective   Massiel Borja is a 55 y.o. female.   No chief complaint on file.    Patient comes in for endometrial biopsy due to postmenopausal bleeding.  She has recently had a abdominal and transvaginal ultrasound which showed a fibroid       The following portions of the patient's history were reviewed and updated as appropriate: allergies, current medications, past family history, past medical history, past social history, past surgical history and problem list.    Past Medical History:   Diagnosis Date   • Atypical squamous cell changes of undetermined significance (ASCUS) on cervical cytology with negative high risk human papilloma virus (HPV) test result    • GERD (gastroesophageal reflux disease)    • Osteopenia        Past Surgical History:   Procedure Laterality Date   • CERVIX LESION DESTRUCTION     • TUBAL ABDOMINAL LIGATION          Family History   Problem Relation Age of Onset   • COPD Mother    • Cancer Father         lung, kidney,   • Other Brother         brother and father-blood clots   • Heart disease Brother    • Stroke Brother    • Diabetes Maternal Aunt    • Diabetes Maternal Uncle    • Stroke Paternal Uncle          at 70   • Other Maternal Grandmother         alzheimers and parkinsons   • Stroke Maternal Grandfather    • Other Paternal Grandfather         alzheimers        Social History     Socioeconomic History   • Marital status: Single     Spouse name: Not on file   • Number of children: Not on file   • Years of education: Not on file   • Highest education level: Not on file   Tobacco Use   • Smoking status: Never Smoker   • Smokeless tobacco: Never Used   Substance and Sexual Activity   • Alcohol use: Yes     Frequency: 4 or more times a week     Drinks per session: 1 or 2     Binge frequency: Never   • Drug use: No   • Sexual activity: Never   Social History Narrative    2 cups of coffee daily.  Not dating.         Review of Systems    Objective   There were no vitals  taken for this visit.  Physical Exam  Exam conducted with a chaperone present.   Genitourinary:     General: Normal vulva.      Exam position: Supine.      Labia:         Right: No rash, tenderness, lesion or injury.         Left: No rash, tenderness, lesion or injury.       Urethra: No prolapse or urethral pain.      Vagina: Normal.      Cervix: Normal.      Uterus: Normal.       Adnexa: Right adnexa normal and left adnexa normal.         Assessment/Plan   Problem List Items Addressed This Visit        High    Postmenopausal bleeding    Current Assessment & Plan     She is in for postmenopausal bleeding have endometrial biopsy.  Benefits and risks are discussed she agrees to this.  Cervix is cleansed with Betadine and endometrial sound was used measuring 7.5 cm..  Pill was then used to obtain 3 specimens which were sent to pathology.  Patient tolerated procedure quite well bimanual exam afterwards was benign.  Advised her that we will call her with pathology's.  If she has further postmenopausal bleeding she is to let us know            Medium    Atypical squamous cell changes of undetermined significance (ASCUS) on cervical cytology with negative high risk human papilloma virus (HPV) test result - Primary    Current Assessment & Plan     Endometrial biopsy done today.  Sounded at 7.5 cm.  3 passes collected and sent for pathology, will call pt. results         Relevant Orders    Endometrial Biopsy    Reference Histopathology (Completed)

## 2021-01-03 NOTE — ASSESSMENT & PLAN NOTE
She is in for postmenopausal bleeding have endometrial biopsy.  Benefits and risks are discussed she agrees to this.  Cervix is cleansed with Betadine and endometrial sound was used measuring 7.5 cm..  Pill was then used to obtain 3 specimens which were sent to pathology.  Patient tolerated procedure quite well bimanual exam afterwards was benign.  Advised her that we will call her with pathology's.  If she has further postmenopausal bleeding she is to let us know

## 2021-03-01 ENCOUNTER — TRANSCRIBE ORDERS (OUTPATIENT)
Dept: ADMINISTRATIVE | Facility: HOSPITAL | Age: 56
End: 2021-03-01

## 2021-03-01 DIAGNOSIS — Z13.6 ENCOUNTER FOR SCREENING FOR VASCULAR DISEASE: Primary | ICD-10-CM

## 2021-03-11 ENCOUNTER — HOSPITAL ENCOUNTER (OUTPATIENT)
Dept: CT IMAGING | Facility: HOSPITAL | Age: 56
Discharge: HOME | End: 2021-03-11

## 2021-03-11 ENCOUNTER — HOSPITAL ENCOUNTER (OUTPATIENT)
Dept: CARDIOLOGY | Facility: HOSPITAL | Age: 56
Discharge: HOME | End: 2021-03-11

## 2021-03-11 DIAGNOSIS — Z13.6 ENCOUNTER FOR SCREENING FOR VASCULAR DISEASE: ICD-10-CM

## 2021-03-11 LAB
BH CV XLRA MEAS - PAD LEFT ABI PT: 1.12
BH CV XLRA MEAS - PAD LEFT ARM: 135 MMHG
BH CV XLRA MEAS - PAD LEFT LEG PT: 159 MMHG
BH CV XLRA MEAS - PAD RIGHT ABI PT: 1.15
BH CV XLRA MEAS - PAD RIGHT ARM: 142 MMHG
BH CV XLRA MEAS - PAD RIGHT LEG PT: 164 MMHG
BH CV XLRA MEAS - PROX AO DIAM: 1.92 CM
BH CV XLRA MEAS LEFT CCA RATIO VEL: -80.8 CM/SEC
BH CV XLRA MEAS LEFT DIST CCA PSV: -80.8 CM/SEC
BH CV XLRA MEAS LEFT ICA RATIO VEL: -98.2 CM/SEC
BH CV XLRA MEAS LEFT ICA/CCA RATIO: 1.2
BH CV XLRA MEAS LEFT MID CCA PSV: 81 CM/SEC
BH CV XLRA MEAS LEFT MID ICA PSV: 98 CM/SEC
BH CV XLRA MEAS LEFT PROX ICA PSV: -98.2 CM/SEC
BH CV XLRA MEAS RIGHT CCA RATIO VEL: 93.2 CM/SEC
BH CV XLRA MEAS RIGHT DIST CCA PSV: 93.2 CM/SEC
BH CV XLRA MEAS RIGHT ICA RATIO VEL: -103 CM/SEC
BH CV XLRA MEAS RIGHT ICA/CCA RATIO: -1.1
BH CV XLRA MEAS RIGHT MID CCA PSV: 93 CM/SEC
BH CV XLRA MEAS RIGHT MID ICA PSV: 103 CM/SEC
BH CV XLRA MEAS RIGHT PROX ICA PSV: -103 CM/SEC

## 2021-03-11 PROCEDURE — 93799 UNLISTED CV SVC/PROCEDURE: CPT

## 2021-03-11 PROCEDURE — 75571 CT HRT W/O DYE W/CA TEST: CPT

## 2021-03-19 LAB
CONV .: NORMAL
PATH REPORT.FINAL DX SPEC: NORMAL
PATH REPORT.GROSS SPEC: NORMAL
PATH REPORT.SITE OF ORIGIN SPEC: NORMAL
PATHOLOGIST NAME: NORMAL
PAYMENT PROCEDURE: NORMAL

## 2022-11-15 ENCOUNTER — TELEPHONE (OUTPATIENT)
Dept: FAMILY MEDICINE CLINIC | Facility: CLINIC | Age: 57
End: 2022-11-15

## 2022-11-15 NOTE — TELEPHONE ENCOUNTER
Caller: Massiel Borja    Relationship to patient: Self    Best call back number: [phone]    Chief complaint: PHYSICAL WITH PAP      If rescheduling, when is the original appointment:12/1/22    Additional notes:HAS TO WORK THAT DAY.  NEEDS TO RESCHEDULE AND WE COULDN'T FIND ANYTHING TILL 2024.  PLEASE CALL TO RESCHEDULE.

## 2023-01-09 ENCOUNTER — OFFICE VISIT (OUTPATIENT)
Dept: FAMILY MEDICINE CLINIC | Facility: CLINIC | Age: 58
End: 2023-01-09
Payer: COMMERCIAL

## 2023-01-09 VITALS
BODY MASS INDEX: 28.85 KG/M2 | DIASTOLIC BLOOD PRESSURE: 72 MMHG | RESPIRATION RATE: 18 BRPM | OXYGEN SATURATION: 97 % | HEART RATE: 89 BPM | TEMPERATURE: 97.7 F | SYSTOLIC BLOOD PRESSURE: 110 MMHG | WEIGHT: 156.8 LBS | HEIGHT: 62 IN

## 2023-01-09 DIAGNOSIS — K21.9 GASTROESOPHAGEAL REFLUX DISEASE, UNSPECIFIED WHETHER ESOPHAGITIS PRESENT: ICD-10-CM

## 2023-01-09 DIAGNOSIS — Z12.4 PAP SMEAR FOR CERVICAL CANCER SCREENING: ICD-10-CM

## 2023-01-09 DIAGNOSIS — R87.610 ATYPICAL SQUAMOUS CELL CHANGES OF UNDETERMINED SIGNIFICANCE (ASCUS) ON CERVICAL CYTOLOGY WITH NEGATIVE HIGH RISK HUMAN PAPILLOMA VIRUS (HPV) TEST RESULT: ICD-10-CM

## 2023-01-09 DIAGNOSIS — K58.1 IRRITABLE BOWEL SYNDROME WITH CONSTIPATION: ICD-10-CM

## 2023-01-09 DIAGNOSIS — I34.0 NONRHEUMATIC MITRAL VALVE REGURGITATION: ICD-10-CM

## 2023-01-09 DIAGNOSIS — N95.1 MENOPAUSAL SYNDROME: ICD-10-CM

## 2023-01-09 DIAGNOSIS — N60.02 BENIGN CYST OF LEFT BREAST: ICD-10-CM

## 2023-01-09 DIAGNOSIS — N95.1 PERIMENOPAUSAL: ICD-10-CM

## 2023-01-09 DIAGNOSIS — N95.0 POSTMENOPAUSAL BLEEDING: ICD-10-CM

## 2023-01-09 DIAGNOSIS — M85.80 OSTEOPENIA, UNSPECIFIED LOCATION: ICD-10-CM

## 2023-01-09 DIAGNOSIS — N92.6 IRREGULAR MENSES: ICD-10-CM

## 2023-01-09 DIAGNOSIS — Z12.31 VISIT FOR SCREENING MAMMOGRAM: ICD-10-CM

## 2023-01-09 DIAGNOSIS — E83.52 HYPERCALCEMIA: ICD-10-CM

## 2023-01-09 DIAGNOSIS — F43.9 SITUATIONAL STRESS: ICD-10-CM

## 2023-01-09 DIAGNOSIS — N72 CERVICAL INFLAMMATION: ICD-10-CM

## 2023-01-09 DIAGNOSIS — E66.3 OVERWEIGHT (BMI 25.0-29.9): ICD-10-CM

## 2023-01-09 DIAGNOSIS — R06.00 DYSPNEA, UNSPECIFIED TYPE: ICD-10-CM

## 2023-01-09 DIAGNOSIS — Z82.49 FAMILY HISTORY OF BLOOD CLOTS: ICD-10-CM

## 2023-01-09 DIAGNOSIS — E78.2 HYPERLIPEMIA, MIXED: Primary | ICD-10-CM

## 2023-01-09 DIAGNOSIS — K59.00 CONSTIPATION, UNSPECIFIED CONSTIPATION TYPE: ICD-10-CM

## 2023-01-09 DIAGNOSIS — Z00.01 ENCOUNTER FOR GENERAL ADULT MEDICAL EXAMINATION WITH ABNORMAL FINDINGS: ICD-10-CM

## 2023-01-09 DIAGNOSIS — I78.1 SPIDER VEINS: ICD-10-CM

## 2023-01-09 DIAGNOSIS — Z00.00 ENCOUNTER FOR ROUTINE HISTORY AND PHYSICAL EXAM IN FEMALE: ICD-10-CM

## 2023-01-09 DIAGNOSIS — N60.19 FIBROCYSTIC BREAST DISEASE (FCBD), UNSPECIFIED LATERALITY: ICD-10-CM

## 2023-01-09 PROCEDURE — 99214 OFFICE O/P EST MOD 30 MIN: CPT | Performed by: FAMILY MEDICINE

## 2023-01-09 PROCEDURE — 99396 PREV VISIT EST AGE 40-64: CPT | Performed by: FAMILY MEDICINE

## 2023-01-09 RX ORDER — MAGNESIUM OXIDE/MAG AA CHELATE 300 MG
2 CAPSULE ORAL NIGHTLY
COMMUNITY

## 2023-01-09 RX ORDER — MELATONIN 10 MG
2 CAPSULE ORAL NIGHTLY
COMMUNITY

## 2023-01-09 NOTE — PROGRESS NOTES
Subjective   Massiel Borja is a 57 y.o. female here for her annual physical with me. Massiel is here for coordination of medical care, to discuss health maintenance, disease prevention as well as to followup on medical problems. Patient has been followed by me since . Patient's last CPE was [id number]. Activity level is moderate. Exercises 4 per week. Appetite is good. Feels well with many complaints. Energy level is good. Sleeps fairly well. Patient's last colonoscopy was 10-2-2017. She is advised to repeat in 10 years. Patient's last mammogram was 9-. Patient is doing routine self skin exam never. Patient is doing routine self-breast exams monthly.    Hyperlipidemia  This is a chronic problem. The current episode started more than 1 year ago. The problem is controlled. Recent lipid tests were reviewed and are high. Factors aggravating her hyperlipidemia include fatty foods. Pertinent negatives include no chest pain, myalgias or shortness of breath. She is currently on no antihyperlipidemic treatment. The current treatment provides no improvement of lipids. There are no compliance problems.  Risk factors for coronary artery disease include dyslipidemia and post-menopausal.   Heart Problem  This is a chronic problem. The current episode started more than 1 year ago. The problem occurs constantly. The problem has been unchanged. Pertinent negatives include no abdominal pain, chest pain, chills, congestion, coughing, fatigue, fever, joint swelling, myalgias, nausea, neck pain, rash, sore throat, vomiting or weakness. Nothing aggravates the symptoms. She has tried nothing for the symptoms. The treatment provided no relief.   Heartburn  She reports no abdominal pain, no chest pain, no coughing, no nausea, no sore throat or no wheezing. This is a chronic problem. The current episode started more than 1 year ago. The problem occurs occasionally. The problem has been gradually improving. Pertinent negatives  include no fatigue or weight loss. There are no known risk factors. She has tried a PPI for the symptoms. The treatment provided significant relief.   Constipation  This is a chronic problem. The current episode started more than 1 year ago. The problem has been gradually improving since onset. Her stool frequency is 1 time per day. The stool is described as firm and formed. The patient is on a high fiber diet. She exercises regularly. There has been adequate water intake. Pertinent negatives include no abdominal pain, back pain, diarrhea, difficulty urinating, fever, nausea, rectal pain, vomiting or weight loss. She has tried fiber and diet changes for the symptoms. The treatment provided moderate relief.   Abnormal Lab  This is a new problem. The current episode started more than 1 month ago. The problem occurs constantly. The problem has been unchanged. Pertinent negatives include no abdominal pain, chest pain, chills, congestion, coughing, fatigue, fever, joint swelling, myalgias, nausea, neck pain, rash, sore throat, vomiting or weakness. The symptoms are aggravated by coughing. The treatment provided no relief.        The following portions of the patient's history were reviewed and updated as appropriate: allergies, current medications, past family history, past medical history, past social history, past surgical history and problem list.    Past Medical History:   Diagnosis Date   • Atypical squamous cell changes of undetermined significance (ASCUS) on cervical cytology with negative high risk human papilloma virus (HPV) test result 2010   • GERD (gastroesophageal reflux disease)    • Osteopenia        Family History   Problem Relation Age of Onset   • Cancer Mother         endometrial   • COPD Mother    • Cancer Father         lung, kidney,   • Other Brother         brother and father-blood clots   • Heart disease Brother    • Stroke Brother    • Other Maternal Grandmother         alzheimers and parkinsons    • Diabetes Maternal Aunt    • Stroke Maternal Grandfather    • Other Paternal Grandfather         alzheimers   • Diabetes Maternal Uncle    • Stroke Paternal Uncle          at 70   • Ovarian cancer Neg Hx    • Breast cancer Neg Hx        Social History     Socioeconomic History   • Marital status: Single   Tobacco Use   • Smoking status: Never   • Smokeless tobacco: Never   Substance and Sexual Activity   • Alcohol use: Yes     Alcohol/week: 6.0 standard drinks     Types: 6 Shots of liquor per week   • Drug use: No   • Sexual activity: Never     Partners: Male     Birth control/protection: None       Social History     Social History Narrative     1 x,  2017, 2 children together.  Lives alone has been dating same man for 3 years now( her neighbor)  Works at [employer] 40 hours weekly, moderate -high stress.  Exercise 3-4 x weekly running or video weight work out, pallets or yoga for 30 minutes.  Caffeine 2-3 cups coffee daily.  Hobbies walking, hiking, movies, reading and cooking.        Past Surgical History:   Procedure Laterality Date   • CERVIX LESION DESTRUCTION     • TUBAL ABDOMINAL LIGATION         Current Outpatient Medications on File Prior to Visit   Medication Sig Dispense Refill   • Cholecalciferol (VITAMIN D3) 125 MCG (5000 UT) capsule capsule Take 5,000 Units by mouth Daily.     • Magnesium 300 MG capsule Take 2 tablets by mouth Every Night.     • Melatonin 10 MG capsule Take 2 tablets by mouth Every Night.     • VALERIAN PO Take  by mouth.     • vitamin C (ASCORBIC ACID) 500 MG tablet Take 500 mg by mouth Daily.       No current facility-administered medications on file prior to visit.       No Known Allergies    Review of Systems   Constitutional: Negative for appetite change, chills, fatigue, fever, unexpected weight gain and unexpected weight loss.   HENT: Negative for congestion, dental problem, ear discharge, ear pain, hearing loss, nosebleeds, postnasal drip,  "rhinorrhea, sinus pressure, sneezing, sore throat, tinnitus and voice change.         Last dental exam , Dr. Yu-Doing well   Eyes: Negative for blurred vision, double vision, pain, redness and visual disturbance.        Last vision exam  Approx. 2021-Dr. Crespo-advised to follow   Respiratory: Negative for cough, shortness of breath, wheezing and stridor.    Cardiovascular: Negative for chest pain, palpitations and leg swelling.   Gastrointestinal: Positive for constipation and GERD. Negative for abdominal pain, anal bleeding, blood in stool, diarrhea, nausea, rectal pain, vomiting and indigestion.        7 BM weekly   Endocrine: Negative for cold intolerance, heat intolerance, polydipsia, polyphagia and polyuria.        [address]  She is a 9  He is a 7   Genitourinary: Negative for difficulty urinating, dysuria, frequency, hematuria and urgency.   Musculoskeletal: Negative for back pain, joint swelling, myalgias, neck pain and neck stiffness.   Skin: Negative for color change, dry skin and rash.   Neurological: Negative for dizziness, syncope, speech difficulty, weakness, light-headedness, headache and memory problem.   Hematological: Does not bruise/bleed easily.   Psychiatric/Behavioral: Negative for decreased concentration, sleep disturbance, depressed mood and stress. The patient is not nervous/anxious.        Objective   Visit Vitals  /72 (BP Location: Left arm, Patient Position: Sitting, Cuff Size: Adult)   Pulse 89   Temp 97.7 °F (36.5 °C) (Temporal)   Resp 18   Ht 156.2 cm (61.5\")   Wt 71.1 kg (156 lb 12.8 oz)   SpO2 97%   BMI 29.15 kg/m²     Physical Exam  Exam conducted with a chaperone present.   Constitutional:       General: She is not in acute distress.     Appearance: She is well-developed. She is not diaphoretic.   HENT:      Head: Normocephalic and atraumatic.      Right Ear: Hearing, tympanic membrane, ear canal and external ear normal.      Left Ear: Hearing, tympanic membrane, " ear canal and external ear normal.      Nose: Nose normal.      Mouth/Throat:      Lips: Pink.      Mouth: Mucous membranes are moist.      Pharynx: Oropharynx is clear. No oropharyngeal exudate.   Eyes:      General: Lids are normal. No scleral icterus.        Right eye: No discharge.         Left eye: No discharge.      Extraocular Movements: Extraocular movements intact.      Conjunctiva/sclera: Conjunctivae normal.      Pupils: Pupils are equal, round, and reactive to light.   Neck:      Thyroid: No thyromegaly.      Trachea: Trachea normal.   Cardiovascular:      Rate and Rhythm: Normal rate and regular rhythm.      Pulses:           Dorsalis pedis pulses are 2+ on the right side and 1+ on the left side.        Posterior tibial pulses are 0 on the right side and 0 on the left side.      Heart sounds: No murmur heard.  Pulmonary:      Effort: Pulmonary effort is normal. No respiratory distress.      Breath sounds: Normal breath sounds. No wheezing or rales.   Chest:      Chest wall: No tenderness.   Breasts:     Right: Normal. No swelling, bleeding, inverted nipple, mass, nipple discharge, skin change or tenderness.      Left: Normal. No swelling, bleeding, inverted nipple, mass, nipple discharge, skin change or tenderness.      Comments: Nodular breast more on the right than the left.  Abdominal:      General: Bowel sounds are normal. There is no distension.      Palpations: Abdomen is soft.      Tenderness: There is no abdominal tenderness. There is no guarding.      Hernia: A hernia is present. Hernia is present in the umbilical area (mild).   Genitourinary:     Exam position: Knee-chest position.      Vagina: Normal. No vaginal discharge.      Cervix: Normal.      Uterus: Normal.       Adnexa: Right adnexa normal.      Rectum: Normal.      Comments: Pap smear obtained today.  Musculoskeletal:         General: No tenderness or deformity. Normal range of motion.      Cervical back: Full passive range of motion  without pain, normal range of motion and neck supple.   Lymphadenopathy:      Cervical: No cervical adenopathy.   Skin:     General: Skin is warm and dry.      Findings: No erythema or rash.      Comments: Nevus of the left buttock.  Spider veins of the left thigh.     Neurological:      General: No focal deficit present.      Mental Status: She is alert and oriented to person, place, and time.      Cranial Nerves: Cranial nerves 2-12 are intact. No cranial nerve deficit.      Sensory: Sensation is intact. No sensory deficit.      Motor: Motor function is intact. No abnormal muscle tone.      Coordination: Coordination is intact. Coordination normal.      Gait: Gait is intact.   Psychiatric:         Behavior: Behavior normal.         Thought Content: Thought content normal.         Judgment: Judgment normal.         Assessment & Plan   Problem List Items Addressed This Visit        High    Postmenopausal bleeding    Current Assessment & Plan     Resolved            Medium    Atypical squamous cell changes of undetermined significance (ASCUS) on cervical cytology with negative high risk human papilloma virus (HPV) test result    Current Assessment & Plan     Advised yrly pap.  Pap smear obtained today.            Unprioritized    Benign cyst of left breast    Current Assessment & Plan     Patient given order for mammogram         Constipation    Current Assessment & Plan     Improved         RESOLVED: Dyspnea    Encounter for general adult medical examination with abnormal findings    Current Assessment & Plan     Encouraged to do self breast and self derm exams.  Congratulated on using seat belts.  Encouraged to do annual physical exams. Immunizations status reviewed.           Family history of blood clots    Overview     Father and brother         Current Assessment & Plan     Discussed referral to hematologist, pt. Declines at present and will discuss with mother         Fibrocystic breast disease    Current  Assessment & Plan     Stable, pt. Given order for Mammogram.         GERD (gastroesophageal reflux disease)    Current Assessment & Plan     Greatly improved.  1-2 x yearly.  Patient no longer needs medication.         Hypercalcemia    Current Assessment & Plan     New dx.  Calcium was 10.7.  Will repeat calcium in 1 month.         Hyperlipemia, mixed - Primary    Overview     Calcium score was 0.4 on 3-2021         Current Assessment & Plan     Worsening. Tot. Chol. 223, Trig. 61, ,   Encouraged to watch fatty intake, exercise more, and lose weight.   No medication.  Is not getting adequate diet and exercise.  Goals developed at last visit were not met.  Follow up in 12  months  Care management needs are self-addressed.  Self-management abilities addressed and patient is capable of managing her own disease.           Menopausal syndrome    Current Assessment & Plan     Stable.  Patient declines treatment.         Mitral insufficiency    Current Assessment & Plan     Stable.         Osteopenia    Current Assessment & Plan     Patient given order for DEXA and will call patient for results.  Patient tolerated Vit. D well without side effects. I feel the benefits of the medication outweigh the risks.         Relevant Orders    DEXA Bone Density Axial (Completed)    Overweight (BMI 25.0-29.9)    Current Assessment & Plan     Patient's (Body mass index is 29.15 kg/m².) indicates that they are overweight with health conditions that include dyslipidemias and GERD . Weight is newly identified. BMI is is above average; BMI management plan is completed. We discussed portion control and increasing exercise.          Pap smear for cervical cancer screening    Current Assessment & Plan     Advised yrly pap.  Pap smear obtained today.         Relevant Orders    Liquid-based Pap Smear, Diagnostic    RESOLVED: Situational stress    Spider veins    Current Assessment & Plan     Minimal         Visit for screening  mammogram    Current Assessment & Plan     Patient given order for Mammogram         Relevant Orders    Mammo Screening Digital Tomosynthesis Bilateral With CAD (Completed)   Other Visit Diagnoses     Encounter for routine history and physical exam in female        Cervical inflammation        Perimenopausal        Irregular menses        Irritable bowel syndrome with constipation                Encouraged to check her skin and breasts monthly. Reviewed exercising regularly, eating a balanced diet, having regular mammograms, immunizations and if due, patient counselled to check with insurance company for coverage;, importance of bone density screening and regularly checking skin and breasts.

## 2023-01-09 NOTE — ASSESSMENT & PLAN NOTE
Patient given order for DEXA and will call patient for results.  Patient tolerated Vit. D well without side effects. I feel the benefits of the medication outweigh the risks.

## 2023-01-09 NOTE — ASSESSMENT & PLAN NOTE
Worsening. Tot. Chol. 223, Trig. 61, ,   Encouraged to watch fatty intake, exercise more, and lose weight.   No medication.  Is not getting adequate diet and exercise.  Goals developed at last visit were not met.  Follow up in 12  months  Care management needs are self-addressed.  Self-management abilities addressed and patient is capable of managing her own disease.

## 2023-01-09 NOTE — ASSESSMENT & PLAN NOTE
Patient's (Body mass index is 29.15 kg/m².) indicates that they are overweight with health conditions that include dyslipidemias and GERD . Weight is newly identified. BMI is is above average; BMI management plan is completed. We discussed portion control and increasing exercise.

## 2023-01-10 ENCOUNTER — HOSPITAL ENCOUNTER (OUTPATIENT)
Dept: MAMMOGRAPHY | Facility: HOSPITAL | Age: 58
Discharge: HOME | End: 2023-01-10
Payer: COMMERCIAL

## 2023-01-10 ENCOUNTER — HOSPITAL ENCOUNTER (OUTPATIENT)
Dept: BONE DENSITY | Facility: HOSPITAL | Age: 58
Discharge: HOME | End: 2023-01-10
Payer: COMMERCIAL

## 2023-01-10 PROCEDURE — 77067 SCR MAMMO BI INCL CAD: CPT

## 2023-01-10 PROCEDURE — 77080 DXA BONE DENSITY AXIAL: CPT

## 2023-01-10 PROCEDURE — 77063 BREAST TOMOSYNTHESIS BI: CPT

## 2023-01-12 LAB
CYTOLOGIST CVX/VAG CYTO: NORMAL
CYTOLOGY CVX/VAG DOC CYTO: NORMAL
CYTOLOGY CVX/VAG DOC THIN PREP: NORMAL
DX ICD CODE: NORMAL
HIV 1 & 2 AB SER-IMP: NORMAL
OTHER STN SPEC: NORMAL
STAT OF ADQ CVX/VAG CYTO-IMP: NORMAL

## 2024-03-07 NOTE — PROGRESS NOTES
Subjective   Massiel Borja is a 58 y.o. female here for her annual physical with me. Massiel is here for coordination of medical care, to discuss health maintenance, disease prevention as well as to followup on medical problems. Patient has been followed by me since . Patient's last CPE was 1-9-23. Activity level is heavy. Exercises 0 per week. Appetite is good. Feels well with many complaints. Energy level is good. Sleeps fairly well. Patient's last colonoscopy was 10-2-2017, by myself. She is advised to repeat in 10 years. Patient's last mammogram was 1-9-23. Patient is doing routine self skin exam monthly. Patient is doing routine self-breast exams monthly.    History of Present Illness  Left lower leg skin lesion.  Hyperlipidemia  This is a chronic problem. The current episode started more than 1 year ago. Recent lipid tests were reviewed and are high. Factors aggravating her hyperlipidemia include fatty foods. Pertinent negatives include no chest pain, myalgias or shortness of breath. She is currently on no antihyperlipidemic treatment. The current treatment provides no improvement of lipids.   Abnormal Lab  This is a new problem. The current episode started 1 to 4 weeks ago. The problem occurs constantly. The problem has been unchanged. Associated symptoms include a rash. Pertinent negatives include no abdominal pain, chest pain, chills, congestion, coughing, fatigue, fever, joint swelling, myalgias, nausea, neck pain, sore throat, vomiting or weakness.   Rash  This is a chronic problem. The current episode started more than 1 month ago. The problem is unchanged. The affected locations include the face. Pertinent negatives include no congestion, cough, diarrhea, eye pain, fatigue, fever, rhinorrhea, shortness of breath, sore throat or vomiting.   Hearing Problem  This is a new problem. The current episode started more than 1 month ago. The problem occurs constantly. The problem has been unchanged.  Associated symptoms include a rash. Pertinent negatives include no abdominal pain, chest pain, chills, congestion, coughing, fatigue, fever, joint swelling, myalgias, nausea, neck pain, sore throat, vomiting or weakness.        The following portions of the patient's history were reviewed and updated as appropriate: allergies, current medications, past family history, past medical history, past social history, past surgical history, and problem list.    Past Medical History:   Diagnosis Date    Atypical squamous cell changes of undetermined significance (ASCUS) on cervical cytology with negative high risk human papilloma virus (HPV) test result     GERD (gastroesophageal reflux disease)     Osteopenia        Family History   Problem Relation Age of Onset    Cancer Mother         endometrial    COPD Mother     Cancer Father         lung, kidney,    Other Brother         brother and father-blood clots    Heart disease Brother     Stroke Brother     Other Maternal Grandmother         alzheimers and parkinsons    Diabetes Maternal Aunt     Stroke Maternal Grandfather     Other Paternal Grandfather         alzheimers    Diabetes Maternal Uncle     Stroke Paternal Uncle          at 70    Ovarian cancer Neg Hx     Breast cancer Neg Hx        Social History     Socioeconomic History    Marital status: Single   Tobacco Use    Smoking status: Never    Smokeless tobacco: Never   Substance and Sexual Activity    Alcohol use: Yes     Alcohol/week: 6.0 standard drinks of alcohol     Types: 6 Shots of liquor per week    Drug use: No    Sexual activity: Never     Partners: Male     Birth control/protection: None       Social History     Social History Narrative     1 x,  2017, 2 children together.  Lives alone has been dating same man for 4 years(3-) now( her neighbor)  Works at [employer] 40 hours weekly, moderate stress.  Exercise none.  Caffeine 1 cups coffee daily.  Hobbies walking, hiking, movies,  reading, traveling and cooking.        Past Surgical History:   Procedure Laterality Date    CERVIX LESION DESTRUCTION  1992    TUBAL ABDOMINAL LIGATION         Current Outpatient Medications on File Prior to Visit   Medication Sig Dispense Refill    Cholecalciferol (VITAMIN D3) 125 MCG (5000 UT) capsule capsule Take 1 capsule by mouth Daily.      DHEA 10 MG tablet Take  by mouth.      Magnesium 300 MG capsule Take 2 tablets by mouth Every Night.      Melatonin 10 MG capsule Take 2 capsules by mouth Every Night.      VALERIAN PO Take  by mouth.      vitamin C (ASCORBIC ACID) 500 MG tablet Take 1 tablet by mouth Daily.      vitamin E 100 UNIT capsule Take 1 capsule by mouth Daily.       No current facility-administered medications on file prior to visit.       No Known Allergies    Review of Systems   Constitutional:  Negative for appetite change, chills, fatigue, fever, unexpected weight gain and unexpected weight loss.   HENT:  Positive for hearing loss. Negative for congestion, dental problem, ear discharge, ear pain, nosebleeds, postnasal drip, rhinorrhea, sinus pressure, sneezing, sore throat, tinnitus and voice change.         Last dental exam 3-12-24 with Dr. Yu-Doing well   Eyes:  Negative for blurred vision, double vision, pain, redness and visual disturbance.        Last vision exam approx. 2021 with Dr. Crespo-advised to follow   Respiratory:  Negative for cough, shortness of breath, wheezing and stridor.    Cardiovascular:  Negative for chest pain, palpitations and leg swelling.   Gastrointestinal:  Negative for abdominal pain, anal bleeding, blood in stool, constipation, diarrhea, nausea, rectal pain, vomiting, GERD and indigestion.        7 BM weekly   Endocrine: Negative for cold intolerance, heat intolerance, polydipsia, polyphagia and polyuria.        Sex Drive  She is a 7-8  He is a 7-8   Genitourinary:  Negative for difficulty urinating, dysuria, frequency, hematuria and urgency.  "  Musculoskeletal:  Negative for back pain, joint swelling, myalgias, neck pain and neck stiffness.   Skin:  Positive for rash and skin lesions (left lower leg). Negative for color change and dry skin.        Throbbing spider veins   Neurological:  Negative for dizziness, syncope, speech difficulty, weakness, light-headedness, headache and memory problem.   Hematological:  Does not bruise/bleed easily.   Psychiatric/Behavioral:  Negative for decreased concentration, sleep disturbance, depressed mood and stress. The patient is not nervous/anxious.        Objective   Visit Vitals  /74 (BP Location: Left arm, Patient Position: Sitting, Cuff Size: Adult)   Pulse 75   Temp 97.5 °F (36.4 °C) (Temporal)   Resp 18   Ht 153.7 cm (60.5\")   Wt 73.9 kg (163 lb)   SpO2 95%   BMI 31.31 kg/m²     Physical Exam  Constitutional:       General: She is not in acute distress.     Appearance: She is well-developed. She is not diaphoretic.   HENT:      Head: Normocephalic and atraumatic.      Right Ear: Hearing, tympanic membrane, ear canal and external ear normal.      Left Ear: Hearing, tympanic membrane, ear canal and external ear normal.      Nose: Nose normal.      Mouth/Throat:      Lips: Pink.      Mouth: Mucous membranes are moist.      Pharynx: Oropharynx is clear. No oropharyngeal exudate.   Eyes:      General: Lids are normal. No scleral icterus.        Right eye: No discharge.         Left eye: No discharge.      Extraocular Movements: Extraocular movements intact.      Conjunctiva/sclera: Conjunctivae normal.      Pupils: Pupils are equal, round, and reactive to light.   Neck:      Thyroid: No thyromegaly.      Trachea: Trachea normal.   Cardiovascular:      Rate and Rhythm: Normal rate and regular rhythm.      Pulses:           Dorsalis pedis pulses are 2+ on the right side and 2+ on the left side.        Posterior tibial pulses are 0 on the right side and 0 on the left side.      Heart sounds: No murmur " heard.  Pulmonary:      Effort: Pulmonary effort is normal. No respiratory distress.      Breath sounds: Normal breath sounds. No wheezing or rales.   Chest:      Chest wall: No tenderness.   Breasts:     Right: Normal. No swelling, bleeding, inverted nipple, mass, nipple discharge, skin change or tenderness.      Left: Normal. No swelling, bleeding, inverted nipple, mass, nipple discharge, skin change or tenderness.      Comments: Nodular breasts right more than left  Abdominal:      General: Bowel sounds are normal. There is no distension.      Palpations: Abdomen is soft.      Tenderness: There is no abdominal tenderness. There is no guarding.      Hernia: No hernia is present. Hernia is present in the umbilical area (very small).   Genitourinary:     Vagina: Normal. No vaginal discharge.      Cervix: Normal.      Uterus: Normal.       Adnexa: Right adnexa normal.      Rectum: Normal.      Comments: Mild rectocele  Pap smear obtained today.  Musculoskeletal:         General: No tenderness or deformity. Normal range of motion.      Cervical back: Full passive range of motion without pain, normal range of motion and neck supple.   Lymphadenopathy:      Cervical: No cervical adenopathy.   Skin:     General: Skin is warm and dry.      Findings: No erythema or rash.      Comments: Mild varicosities of the left lateral thigh.  Spider veins of the left thigh.Nevus of the left buttock.  Facial redness.  Nevus left lower leg.   Neurological:      General: No focal deficit present.      Mental Status: She is alert and oriented to person, place, and time.      Cranial Nerves: Cranial nerves 2-12 are intact. No cranial nerve deficit.      Sensory: Sensation is intact. No sensory deficit.      Motor: Motor function is intact. No abnormal muscle tone.      Coordination: Coordination is intact. Coordination normal.      Gait: Gait is intact.   Psychiatric:         Behavior: Behavior normal.         Thought Content: Thought  content normal.         Judgment: Judgment normal.         Assessment & Plan   Problem List Items Addressed This Visit          High    RESOLVED: Postmenopausal bleeding    Current Assessment & Plan     Resolved, thus delete.            Medium    Atypical squamous cell changes of undetermined significance (ASCUS) on cervical cytology with negative high risk human papilloma virus (HPV) test result    Current Assessment & Plan     Continue yrly pap smear            Unprioritized    Benign cyst of left breast    Current Assessment & Plan     Advised repeat mammogram and pt. Given order for mammogram         Chronic low back pain    Current Assessment & Plan     New dx.  Advised stretching exercises.  Pt. Given back owners manual.         Constipation    Current Assessment & Plan     Stable.  Advised start Metamucil 1 teaspoon daily and work up to 2 tablespoons daily         Encounter for general adult medical examination with abnormal findings    Current Assessment & Plan     Encouraged to do self-breast exam, self-testicle exams, and self derm exams. Congratulated on using seat belts.  Encouraged to do annual physical exams.  Immunization status reviewed.           Erythematous rash    Overview     Face  Retinol treatment with Lucy COLMENARES         Current Assessment & Plan     New dx.  Possibly 2nd to menopause.  Advised to stop Retinol treatments, if not improving after stooping then will refer to dermatologist.         Family history of blood clots    Overview     Father and brother         Current Assessment & Plan     Pt. Declines referral to hematologist.         Fibrocystic breast disease    Current Assessment & Plan     Not felt on today's exam         RESOLVED: GERD (gastroesophageal reflux disease)    Current Assessment & Plan     Resolved, thus delete         Hypercalcemia    Current Assessment & Plan     Resolved x 1.  CMP done 2-, read by me, reviewed with pt.          Hyperglycemia     Current Assessment & Plan     New dx.  CMP done 2-, read by me, reviewed with pt. Glucose 108 up from 93.  Will draw A1c with next labs.  Encourged to watch sugar intake, exercise more, lose weight.           Relevant Orders    Hemoglobin A1c    Hyperlipemia, mixed - Primary    Overview     Calcium score was 0.4 on 3-2021         Current Assessment & Plan     Lipid and CMP done 2-, read by me, reviewed with pt.  Trig. 88 up from 84, Tot. Chol. 203 up from 181, HDL 79 down from 84,  up from 81  Worsening.  Encouraged to watch fatty intake, exercise more, and lose weight.   No medication   Is not getting adequate diet and exercise  Goals developed at last visit were not met   Follow up in 3-4 months  Care management needs are self-addressed.  Self-management abilities addressed and patient is capable of managing her own disease.           Relevant Orders    Comprehensive Metabolic Panel    Lipid Panel With / Chol / HDL Ratio    Leukopenia    Current Assessment & Plan     New dx.  CBC done 2-, read by me, reviewed with pt. WBC was 3.8.  Will repeat with next labs, possibly 2nd to URI         Relevant Orders    CBC & Differential    Menopausal syndrome    Current Assessment & Plan     Possibly worse, pt. Declines treatment.         Relevant Orders    DEXA Bone Density Axial    Nevus of left lower leg    Overview     Left lower leg.         Current Assessment & Plan     New dx.  Advised to watch for changes         Noise-induced hearing loss of both ears    Current Assessment & Plan     Very Mild.  Advised to avoid noise exposure and wear hearing protection         Open-angle glaucoma    Current Assessment & Plan     Advised to follow with eye doctoe,         Osteopenia    Current Assessment & Plan     Pt. Given order for DEXA         Overweight (BMI 25.0-29.9)    Current Assessment & Plan     Patient's (Body mass index is 31.31 kg/m².) indicates that they are overweight with health conditions  that include dyslipidemias . Weight is worsening. BMI is is above average; BMI management plan is completed. We discussed portion control and increasing exercise.          Pap smear for cervical cancer screening    Current Assessment & Plan     Pap smear obtained today.         Screening for cervical cancer    Relevant Orders    Liquid-based Pap Smear, Diagnostic    Situational stress    Current Assessment & Plan     Improving.  Due to work problems, pt. Declines medication.         Spider veins    Current Assessment & Plan     Very mild and stable         Visit for screening mammogram    Current Assessment & Plan     Pt. Given order for Mammogram         Relevant Orders    Mammo Screening Digital Tomosynthesis Bilateral With CAD (Completed)     Other Visit Diagnoses       Nonrheumatic mitral valve regurgitation        Not seen on ECHO done 3-3-2020, thus delete.              Encouraged to check her skin and breasts monthly. Reviewed exercising regularly, eating a balanced diet, having regular mammograms, immunizations and if due, patient counselled to check with insurance company for coverage;, importance of bone density screening, and regularly checking skin and breasts.

## 2024-03-14 ENCOUNTER — HOSPITAL ENCOUNTER (OUTPATIENT)
Dept: MAMMOGRAPHY | Facility: HOSPITAL | Age: 59
Discharge: HOME | End: 2024-03-14
Admitting: FAMILY MEDICINE
Payer: COMMERCIAL

## 2024-03-14 ENCOUNTER — OFFICE VISIT (OUTPATIENT)
Dept: FAMILY MEDICINE CLINIC | Facility: CLINIC | Age: 59
End: 2024-03-14
Payer: COMMERCIAL

## 2024-03-14 VITALS
TEMPERATURE: 97.5 F | DIASTOLIC BLOOD PRESSURE: 74 MMHG | HEIGHT: 61 IN | WEIGHT: 163 LBS | RESPIRATION RATE: 18 BRPM | BODY MASS INDEX: 30.78 KG/M2 | SYSTOLIC BLOOD PRESSURE: 112 MMHG | HEART RATE: 75 BPM | OXYGEN SATURATION: 95 %

## 2024-03-14 DIAGNOSIS — D72.819 LEUKOPENIA, UNSPECIFIED TYPE: ICD-10-CM

## 2024-03-14 DIAGNOSIS — R21 ERYTHEMATOUS RASH: ICD-10-CM

## 2024-03-14 DIAGNOSIS — H83.3X3 NOISE-INDUCED HEARING LOSS OF BOTH EARS: ICD-10-CM

## 2024-03-14 DIAGNOSIS — I34.0 NONRHEUMATIC MITRAL VALVE REGURGITATION: ICD-10-CM

## 2024-03-14 DIAGNOSIS — K59.00 CONSTIPATION, UNSPECIFIED CONSTIPATION TYPE: ICD-10-CM

## 2024-03-14 DIAGNOSIS — I78.1 SPIDER VEINS: ICD-10-CM

## 2024-03-14 DIAGNOSIS — K21.9 GASTROESOPHAGEAL REFLUX DISEASE, UNSPECIFIED WHETHER ESOPHAGITIS PRESENT: ICD-10-CM

## 2024-03-14 DIAGNOSIS — F43.9 SITUATIONAL STRESS: ICD-10-CM

## 2024-03-14 DIAGNOSIS — R73.9 HYPERGLYCEMIA: ICD-10-CM

## 2024-03-14 DIAGNOSIS — M85.80 OSTEOPENIA, UNSPECIFIED LOCATION: ICD-10-CM

## 2024-03-14 DIAGNOSIS — M54.50 CHRONIC LOW BACK PAIN WITHOUT SCIATICA, UNSPECIFIED BACK PAIN LATERALITY: ICD-10-CM

## 2024-03-14 DIAGNOSIS — D22.72 NEVUS OF LEFT LOWER LEG: ICD-10-CM

## 2024-03-14 DIAGNOSIS — N60.19 FIBROCYSTIC BREAST DISEASE (FCBD), UNSPECIFIED LATERALITY: ICD-10-CM

## 2024-03-14 DIAGNOSIS — N95.0 POSTMENOPAUSAL BLEEDING: ICD-10-CM

## 2024-03-14 DIAGNOSIS — R87.610 ATYPICAL SQUAMOUS CELL CHANGES OF UNDETERMINED SIGNIFICANCE (ASCUS) ON CERVICAL CYTOLOGY WITH NEGATIVE HIGH RISK HUMAN PAPILLOMA VIRUS (HPV) TEST RESULT: ICD-10-CM

## 2024-03-14 DIAGNOSIS — E83.52 HYPERCALCEMIA: ICD-10-CM

## 2024-03-14 DIAGNOSIS — E78.2 HYPERLIPEMIA, MIXED: Primary | ICD-10-CM

## 2024-03-14 DIAGNOSIS — G89.29 CHRONIC LOW BACK PAIN WITHOUT SCIATICA, UNSPECIFIED BACK PAIN LATERALITY: ICD-10-CM

## 2024-03-14 DIAGNOSIS — Z12.31 VISIT FOR SCREENING MAMMOGRAM: ICD-10-CM

## 2024-03-14 DIAGNOSIS — E66.3 OVERWEIGHT (BMI 25.0-29.9): ICD-10-CM

## 2024-03-14 DIAGNOSIS — Z00.01 ENCOUNTER FOR GENERAL ADULT MEDICAL EXAMINATION WITH ABNORMAL FINDINGS: ICD-10-CM

## 2024-03-14 DIAGNOSIS — Z82.49 FAMILY HISTORY OF BLOOD CLOTS: ICD-10-CM

## 2024-03-14 DIAGNOSIS — N95.1 MENOPAUSAL SYNDROME: ICD-10-CM

## 2024-03-14 DIAGNOSIS — N60.02 BENIGN CYST OF LEFT BREAST: ICD-10-CM

## 2024-03-14 DIAGNOSIS — H40.10X1 OPEN-ANGLE GLAUCOMA, MILD STAGE, UNSPECIFIED LATERALITY, UNSPECIFIED OPEN-ANGLE GLAUCOMA TYPE: ICD-10-CM

## 2024-03-14 DIAGNOSIS — Z12.4 SCREENING FOR CERVICAL CANCER: ICD-10-CM

## 2024-03-14 DIAGNOSIS — Z12.4 PAP SMEAR FOR CERVICAL CANCER SCREENING: ICD-10-CM

## 2024-03-14 PROBLEM — IMO0002 BORDERLINE OPEN-ANGLE GLAUCOMA: Chronic | Status: ACTIVE | Noted: 2020-02-21

## 2024-03-14 PROBLEM — H40.10X0 OPEN-ANGLE GLAUCOMA: Status: ACTIVE | Noted: 2020-02-21

## 2024-03-14 PROCEDURE — 77067 SCR MAMMO BI INCL CAD: CPT

## 2024-03-14 PROCEDURE — 77063 BREAST TOMOSYNTHESIS BI: CPT

## 2024-03-14 RX ORDER — MELATONIN 3 MG
TABLET ORAL
COMMUNITY

## 2024-03-14 NOTE — ASSESSMENT & PLAN NOTE
New dx.  CBC done 2-, read by me, reviewed with pt. WBC was 3.8.  Will repeat with next labs, possibly 2nd to URI

## 2024-03-14 NOTE — ASSESSMENT & PLAN NOTE
Lipid and CMP done 2-, read by me, reviewed with pt.  Trig. 88 up from 84, Tot. Chol. 203 up from 181, HDL 79 down from 84,  up from 81  Worsening.  Encouraged to watch fatty intake, exercise more, and lose weight.   No medication   Is not getting adequate diet and exercise  Goals developed at last visit were not met   Follow up in 3-4 months  Care management needs are self-addressed.  Self-management abilities addressed and patient is capable of managing her own disease.

## 2024-03-14 NOTE — ASSESSMENT & PLAN NOTE
Patient's (Body mass index is 31.31 kg/m².) indicates that they are overweight with health conditions that include dyslipidemias . Weight is worsening. BMI is is above average; BMI management plan is completed. We discussed portion control and increasing exercise.

## 2024-03-14 NOTE — ASSESSMENT & PLAN NOTE
New dx.  Possibly 2nd to menopause.  Advised to stop Retinol treatments, if not improving after stooping then will refer to dermatologist.

## 2024-03-14 NOTE — ASSESSMENT & PLAN NOTE
New dx.  CMP done 2-, read by me, reviewed with pt. Glucose 108 up from 93.  Will draw A1c with next labs.  Encourged to watch sugar intake, exercise more, lose weight.

## 2024-03-14 NOTE — ASSESSMENT & PLAN NOTE
No refill needed.    Vitamin D level is now normal.    Recommend taking 2,000 IUs oral daily OTC now.      Can have a repeat Hydroxy 25 vitamin D lab in 3 months to see if level remains above 30.   Possibly worse, pt. Declines treatment.   REVIEW OF SYSTEMS:  GENERAL: Patient  fevers,chills,night sweats, excessive fatigue, anorexia, weight loss  ALLERGIC/IMMUNOLOGIC: no reactions  EYES: Patient denies diplopia, significant visual difficulties   ENT/MOUTH: Patient denies mucositis, problems with hearing, sore throat, or mouth sores, sinus drainage  ENDOCRINE: Patient denies diabetes, thyroid disease, hormone replacement, hot flashes or night sweats  HEMATOLOGIC/LYMPHATIC: Patient denies easy bruising or bleeding, tender or palpable lymph nodes  BREASTS: Patient denies abnormal masses of breast, nipple discharge, pain  RESPIRATORY:Patient denies dyspnea on exertion, chest pain, cough, hemoptysis  CARDIOVASCULAR: Patient denies anginal chest pain, palpitations, orthopnea,   peripheral edema  GASTROINTESTINAL: Patient denies nausea, vomiting, diarrhea, Gi bleeding,   constipation, change in bowel habits, heartburn, early satiety   : Patient denies abnormal genital masses, hematuria, hesitancy, dysuria,increase frequency, urgency, incontinence, problems with sexual activity  MUSCULOSKELETAL: Patient denies joint pain, swelling or redness, decreased range of motion  SKIN: Patient denies chronic rashes, inflammation, ulceration or skin changes  NEUROLOGIC: Patient denies headache, blurred vision, areas of focal weakness or numbness, abnormal gait, sensory problems  PSYCHIATRIC: Patient denies insomnia, depression, anxiety, mood swings, shawn, psychotropic drugs      FACETIME:2 minutes

## 2024-03-19 LAB
CYTOLOGIST CVX/VAG CYTO: NORMAL
CYTOLOGY CVX/VAG DOC CYTO: NORMAL
CYTOLOGY CVX/VAG DOC THIN PREP: NORMAL
DX ICD CODE: NORMAL
Lab: NORMAL
OTHER STN SPEC: NORMAL
STAT OF ADQ CVX/VAG CYTO-IMP: NORMAL

## 2024-10-28 NOTE — ASSESSMENT & PLAN NOTE
Discussed referral to hematologist, pt. Declines at present and will discuss with mother   

## 2025-03-17 ENCOUNTER — OFFICE VISIT (OUTPATIENT)
Dept: FAMILY MEDICINE CLINIC | Facility: CLINIC | Age: 60
End: 2025-03-17
Payer: COMMERCIAL

## 2025-03-17 VITALS
HEIGHT: 62 IN | RESPIRATION RATE: 18 BRPM | HEART RATE: 89 BPM | DIASTOLIC BLOOD PRESSURE: 76 MMHG | TEMPERATURE: 96.9 F | SYSTOLIC BLOOD PRESSURE: 122 MMHG | BODY MASS INDEX: 28.3 KG/M2 | WEIGHT: 153.8 LBS | OXYGEN SATURATION: 98 %

## 2025-03-17 DIAGNOSIS — R87.610 ATYPICAL SQUAMOUS CELL CHANGES OF UNDETERMINED SIGNIFICANCE (ASCUS) ON CERVICAL CYTOLOGY WITH NEGATIVE HIGH RISK HUMAN PAPILLOMA VIRUS (HPV) TEST RESULT: ICD-10-CM

## 2025-03-17 DIAGNOSIS — M54.50 CHRONIC LOW BACK PAIN WITHOUT SCIATICA, UNSPECIFIED BACK PAIN LATERALITY: ICD-10-CM

## 2025-03-17 DIAGNOSIS — N60.02 BENIGN CYST OF LEFT BREAST: ICD-10-CM

## 2025-03-17 DIAGNOSIS — N95.1 MENOPAUSAL SYNDROME: ICD-10-CM

## 2025-03-17 DIAGNOSIS — H40.10X1 OPEN-ANGLE GLAUCOMA, MILD STAGE, UNSPECIFIED LATERALITY, UNSPECIFIED OPEN-ANGLE GLAUCOMA TYPE: ICD-10-CM

## 2025-03-17 DIAGNOSIS — G89.29 CHRONIC LOW BACK PAIN WITHOUT SCIATICA, UNSPECIFIED BACK PAIN LATERALITY: ICD-10-CM

## 2025-03-17 DIAGNOSIS — E83.52 HYPERCALCEMIA: ICD-10-CM

## 2025-03-17 DIAGNOSIS — Z82.49 FAMILY HISTORY OF BLOOD CLOTS: ICD-10-CM

## 2025-03-17 DIAGNOSIS — R21 ERYTHEMATOUS RASH: ICD-10-CM

## 2025-03-17 DIAGNOSIS — K59.00 CONSTIPATION, UNSPECIFIED CONSTIPATION TYPE: ICD-10-CM

## 2025-03-17 DIAGNOSIS — M85.80 OSTEOPENIA, UNSPECIFIED LOCATION: ICD-10-CM

## 2025-03-17 DIAGNOSIS — I78.1 SPIDER VEINS: ICD-10-CM

## 2025-03-17 DIAGNOSIS — E66.3 OVERWEIGHT (BMI 25.0-29.9): ICD-10-CM

## 2025-03-17 DIAGNOSIS — E78.2 HYPERLIPEMIA, MIXED: Primary | ICD-10-CM

## 2025-03-17 DIAGNOSIS — N60.19 FIBROCYSTIC BREAST DISEASE (FCBD), UNSPECIFIED LATERALITY: ICD-10-CM

## 2025-03-17 DIAGNOSIS — D72.819 LEUKOPENIA, UNSPECIFIED TYPE: ICD-10-CM

## 2025-03-17 DIAGNOSIS — Z12.4 SCREENING FOR CERVICAL CANCER: ICD-10-CM

## 2025-03-17 DIAGNOSIS — R73.9 HYPERGLYCEMIA: ICD-10-CM

## 2025-03-17 DIAGNOSIS — D22.72 NEVUS OF LEFT LOWER LEG: ICD-10-CM

## 2025-03-17 DIAGNOSIS — Z13.6 SCREENING FOR CARDIOVASCULAR CONDITION: ICD-10-CM

## 2025-03-17 DIAGNOSIS — F43.9 SITUATIONAL STRESS: ICD-10-CM

## 2025-03-17 DIAGNOSIS — Z12.31 VISIT FOR SCREENING MAMMOGRAM: ICD-10-CM

## 2025-03-17 DIAGNOSIS — H83.3X3 NOISE-INDUCED HEARING LOSS OF BOTH EARS: ICD-10-CM

## 2025-03-17 DIAGNOSIS — Z00.01 ENCOUNTER FOR GENERAL ADULT MEDICAL EXAMINATION WITH ABNORMAL FINDINGS: ICD-10-CM

## 2025-03-17 LAB
EXPIRATION DATE: NORMAL
HBA1C MFR BLD: 5.2 % (ref 4.5–5.7)
Lab: NORMAL

## 2025-03-17 NOTE — ASSESSMENT & PLAN NOTE
Refill encounter for metformin, cetrizine and levothyroxine   Routed to provider for approval  Last office visit  7/12/17   Last refill 6/23/17 and 3/30/17   Last lab  TSH done 2015 OVERDUE and A1C done 5/18/17 6.2      Sent levothyroxine for provider approval   Worse.   CMP done 1-, results reviewed with pt.   Calcium was 10.5 up from 10.3.  Offered PTH with calcium, pt. Declined.

## 2025-03-17 NOTE — ASSESSMENT & PLAN NOTE
Improved.  A1c done in office today, read by me, reviewed with pt.  A1c was 5.1.  Glucose done 1-, results reviewed with pt.  Glucose was 105 down from 108.

## 2025-03-17 NOTE — PROGRESS NOTES
Subjective   Massiel Borja is a 59 y.o. female here for her annual physical with me. Massiel is here for coordination of medical care, to discuss health maintenance, disease prevention as well as to followup on medical problems. Patient has been followed by me since . Patient's last CPE was 3-14-24. Activity level is moderate. Exercises 6 per week. Appetite is good. Feels well with many complaints. Energy level is good. Sleeps well. Patient's last colonoscopy was 10-2-2017 with myself. She is advised to repeat in 10 years. Patient's last mammogram was 3-14-24. Patient is doing routine self skin exam monthly. Patient is doing routine self-breast exams monthly.    History of Present Illness  History of ASCUS.  Hyperlipidemia  This is a chronic problem. The current episode started more than 1 year ago. Recent lipid tests were reviewed and are high. Factors aggravating her hyperlipidemia include fatty foods. Pertinent negatives include no chest pain, myalgias or shortness of breath. She is currently on no antihyperlipidemic treatment. The current treatment provides no improvement of lipids.   Hyperglycemia  Symptoms are chronic.   Symptoms occur constantly.   Symptoms have been improved since onset.   Pertinent negative symptoms include no abdominal pain, no chest pain, no chills, no congestion, no cough, no fatigue, no fever, no myalgias, no nausea, no neck pain, no rash, no sore throat, no dysuria, no vomiting and no weakness.   Abnormal Lab  Today's concern : Leukopenia.   Symptoms are chronic.   Onset was 1 to 5 years.   Symptoms have been improved since onset.   Pertinent negative symptoms include no abdominal pain, no chest pain, no chills, no congestion, no cough, no fatigue, no fever, no myalgias, no nausea, no neck pain, no rash, no sore throat, no dysuria, no vomiting and no weakness.   Back Pain  This is a chronic problem. The current episode started more than 1 year ago. The problem occurs  intermittently. The problem is unchanged. The pain is present in the lumbar spine. Pertinent negatives include no abdominal pain, chest pain, dysuria, fever, weakness or weight loss.        The following portions of the patient's history were reviewed and updated as appropriate: allergies, current medications, past family history, past medical history, past social history, past surgical history, and problem list.    Past Medical History:   Diagnosis Date    Atypical squamous cell changes of undetermined significance (ASCUS) on cervical cytology with negative high risk human papilloma virus (HPV) test result     GERD (gastroesophageal reflux disease)     Osteopenia        Family History   Problem Relation Age of Onset    Cancer Mother         endometrial    COPD Mother     Cancer Father         lung, kidney,    Other Brother         brother and father-blood clots    Heart disease Brother     Stroke Brother     Other Maternal Grandmother         alzheimers and parkinsons    Diabetes Maternal Aunt     Stroke Maternal Grandfather     Other Paternal Grandfather         alzheimers    Diabetes Maternal Uncle     Stroke Paternal Uncle          at 70    Ovarian cancer Neg Hx     Breast cancer Neg Hx        Social History     Socioeconomic History    Marital status: Single   Tobacco Use    Smoking status: Never    Smokeless tobacco: Never   Substance and Sexual Activity    Alcohol use: Yes     Alcohol/week: 6.0 standard drinks of alcohol     Types: 6 Shots of liquor per week    Drug use: No    Sexual activity: Never     Partners: Male     Birth control/protection: None       Social History     Social History Narrative     1 x,  2017, 2 children together.  Lives alone has been dating same man for 5 years(3-) now( her neighbor)  Works at [employer] 40 hours weekly, moderate stress.  Exercise 30-40 minutes yoga, piliates and weight lifting 21 days a month.  Caffeine 1 cups coffee daily.  Hobbies  walking, hiking, movies, reading, traveling and cooking.        Past Surgical History:   Procedure Laterality Date    CERVIX LESION DESTRUCTION  1992    TUBAL ABDOMINAL LIGATION         Current Outpatient Medications on File Prior to Visit   Medication Sig Dispense Refill    Cholecalciferol (VITAMIN D3) 125 MCG (5000 UT) capsule capsule Take 1 capsule by mouth Daily.      vitamin C (ASCORBIC ACID) 500 MG tablet Take 1 tablet by mouth Daily.      vitamin E 100 UNIT capsule Take 1 capsule by mouth Daily.      DHEA 10 MG tablet Take  by mouth. (Patient not taking: Reported on 3/17/2025)       No current facility-administered medications on file prior to visit.       No Known Allergies    Review of Systems   Constitutional:  Negative for appetite change, chills, fatigue, fever, unexpected weight gain and unexpected weight loss.   HENT:  Negative for congestion, dental problem, ear discharge, ear pain, hearing loss, nosebleeds, postnasal drip, rhinorrhea, sinus pressure, sneezing, sore throat, tinnitus and voice change.         Last dental exam Fall 2024, Dr. Yu-Doing well   Eyes:  Negative for blurred vision, double vision, pain, redness and visual disturbance.        Last vision exam May 2024, Dr. Cook office-advised return in 2 years.   Respiratory:  Negative for cough, shortness of breath, wheezing and stridor.    Cardiovascular:  Negative for chest pain, palpitations and leg swelling.   Gastrointestinal:  Negative for abdominal pain, anal bleeding, blood in stool, constipation, diarrhea, nausea, rectal pain, vomiting, GERD and indigestion.        7 BM weekly   Endocrine: Negative for cold intolerance, heat intolerance, polydipsia, polyphagia and polyuria.        Sex Drive  She is a 10  He is a 8   Genitourinary:  Negative for difficulty urinating, dysuria, frequency, hematuria and urgency.   Musculoskeletal:  Positive for back pain. Negative for joint swelling, myalgias, neck pain and neck stiffness.   Skin:   "Negative for color change, dry skin and rash.   Neurological:  Negative for dizziness, syncope, speech difficulty, weakness, light-headedness, headache and memory problem.   Hematological:  Does not bruise/bleed easily.   Psychiatric/Behavioral:  Negative for decreased concentration, sleep disturbance, depressed mood and stress. The patient is not nervous/anxious.        Objective   Visit Vitals  /76 (BP Location: Left arm, Patient Position: Sitting, Cuff Size: Adult)   Pulse 89   Temp 96.9 °F (36.1 °C) (Temporal)   Resp 18   Ht 156.2 cm (61.5\")   Wt 69.8 kg (153 lb 12.8 oz)   SpO2 98%   BMI 28.59 kg/m²     Physical Exam  Exam conducted with a chaperone present.   Constitutional:       General: She is not in acute distress.     Appearance: She is well-developed. She is not diaphoretic.   HENT:      Head: Normocephalic and atraumatic.      Right Ear: Hearing, tympanic membrane, ear canal and external ear normal.      Left Ear: Hearing, tympanic membrane, ear canal and external ear normal.      Nose: Nose normal.      Mouth/Throat:      Lips: Pink.      Mouth: Mucous membranes are moist.      Pharynx: Oropharynx is clear. No oropharyngeal exudate.   Eyes:      General: Lids are normal. No scleral icterus.        Right eye: No discharge.         Left eye: No discharge.      Extraocular Movements: Extraocular movements intact.      Conjunctiva/sclera: Conjunctivae normal.      Pupils: Pupils are equal, round, and reactive to light.   Neck:      Thyroid: No thyromegaly.      Trachea: Trachea normal.   Cardiovascular:      Rate and Rhythm: Normal rate and regular rhythm.      Pulses:           Dorsalis pedis pulses are 1+ on the right side and 1+ on the left side.        Posterior tibial pulses are 0 on the right side and 0 on the left side.      Heart sounds: No murmur heard.  Pulmonary:      Effort: Pulmonary effort is normal. No respiratory distress.      Breath sounds: Normal breath sounds. No wheezing or rales. "   Chest:      Chest wall: No tenderness.   Breasts:     Right: Normal. No swelling, bleeding, inverted nipple, mass, nipple discharge, skin change or tenderness.      Left: Normal. No swelling, bleeding, inverted nipple, mass, nipple discharge, skin change or tenderness.      Comments: Nodular breasts right more than left.  Abdominal:      General: Bowel sounds are normal. There is no distension.      Palpations: Abdomen is soft.      Tenderness: There is no abdominal tenderness. There is no guarding.      Hernia: No hernia is present. There is no hernia in the left inguinal area or right inguinal area.   Genitourinary:     General: Normal vulva.      Exam position: Lithotomy position.      Pubic Area: No rash.       Labia:         Right: No rash, tenderness, lesion or injury.         Left: No rash, tenderness, lesion or injury.       Urethra: No prolapse, urethral pain, urethral swelling or urethral lesion.      Vagina: Normal. No signs of injury. No vaginal discharge, erythema, tenderness, bleeding, lesions or prolapsed vaginal walls.      Cervix: Normal. No lesion or cervical bleeding.      Uterus: Normal.       Adnexa: Right adnexa normal and left adnexa normal.      Rectum: Normal.      Comments: Minimal rectocele.  Pap smear obained today.  Musculoskeletal:         General: No tenderness or deformity. Normal range of motion.      Cervical back: Full passive range of motion without pain, normal range of motion and neck supple.   Lymphadenopathy:      Cervical: No cervical adenopathy.      Lower Body: No right inguinal adenopathy. No left inguinal adenopathy.   Skin:     General: Skin is warm and dry.      Findings: No erythema or rash.      Comments: Mild varicosities of the left lateral thigh.  Spider veins of the left thigh.  Nevus of the left buttock.  Facial redness.   Neurological:      General: No focal deficit present.      Mental Status: She is alert and oriented to person, place, and time.      Cranial  Nerves: Cranial nerves 2-12 are intact. No cranial nerve deficit.      Sensory: Sensation is intact. No sensory deficit.      Motor: Motor function is intact. No abnormal muscle tone.      Coordination: Coordination normal.      Gait: Gait is intact.   Psychiatric:         Behavior: Behavior normal.         Thought Content: Thought content normal.         Judgment: Judgment normal.       Assessment & Plan   Problem List Items Addressed This Visit          Medium    Atypical squamous cell changes of undetermined significance (ASCUS) on cervical cytology with negative high risk human papilloma virus (HPV) test result    Current Assessment & Plan   Continue yrly pap smear            Low    Hyperglycemia    Current Assessment & Plan    Improved.  A1c done in office today, read by me, reviewed with pt.  A1c was 5.1.  Glucose done 1-, results reviewed with pt.  Glucose was 105 down from 108.           Relevant Orders    POC Glycosylated Hemoglobin (Hb A1C) (Completed)    Hemoglobin A1c    Hyperlipemia, mixed - Primary    Overview   Calcium score was 0.4 on 3-2021.  Encouraged to repeat this sometime in the future she agrees to this orders given.         Current Assessment & Plan   Lipid and CMP done 1-, results reviewed with pt.   Trig. 108 up from 88, Tot. Chol. 213 up from 203, HDL 77 down from 79,  up [id number]  Worsening   Encouraged to watch fatty intake, exercise more, and lose weight.   No medication    Is not getting adequate diet and exercise  Goals developed at last visit were not met  Follow up in 12 months  Care management needs are self-addressed. Self-management abilities addressed and patient is capable of managing her own disease.          Relevant Orders    Lipid Panel With / Chol / HDL Ratio    Comprehensive Metabolic Panel       Unprioritized    Benign cyst of left breast    Current Assessment & Plan   Advised repeat mammogram and pt. Given order for mammogram         Chronic low back  pain    Current Assessment & Plan   Intermittent and mild.  Discussed trying Advil and Tylenol PRN.         RESOLVED: Constipation    Overview   7 weekly         Current Assessment & Plan   Resolved thus delete         Encounter for general adult medical examination with abnormal findings    Current Assessment & Plan   Encouraged to do self-breast exam, self-testicle exams, and self derm exams. Congratulated on using seat belts.  Encouraged to do annual physical exams.  Immunization status reviewed.           RESOLVED: Erythematous rash    Overview   Face  Retinol treatment with Lucy COLMENARES         Current Assessment & Plan   Resolved thus delete            Family history of blood clots    Overview   Father and brother         Current Assessment & Plan   Pt. Declines testing         RESOLVED: Fibrocystic breast disease    Current Assessment & Plan   Resolved thus delete         Hypercalcemia    Current Assessment & Plan   Worse.   CMP done 1-, results reviewed with pt.   Calcium was 10.5 up from 10.3.  Offered PTH with calcium, pt. Declined.         Leukopenia    Current Assessment & Plan   Resolved x 1.    CBC done 1-, results reviewed with pt. WBC was 4.7 up from 3.6         Menopausal syndrome    Current Assessment & Plan   Patient given order for DEXA.         Relevant Orders    DEXA Bone Density Axial    Vitamin D,25-Hydroxy    RESOLVED: Nevus of left lower leg    Overview   Left lower leg.         Current Assessment & Plan   Resolved thus delete            Noise-induced hearing loss of both ears    Overview   Very mild--audiogram for work scanned to chart         Current Assessment & Plan   Mild.  Advised to avoid noise exposure and wear hearing protection         RESOLVED: Open-angle glaucoma    Current Assessment & Plan   Error per Dr. Cook office thus delete.         Osteopenia    Current Assessment & Plan   Pt. Given order for DEXA         Overweight (BMI 25.0-29.9)    Current  Assessment & Plan   Patient's (Body mass index is 28.59 kg/m².) indicates that they are overweight with health conditions that include dyslipidemias . Weight is improving with lifestyle modifications. BMI is above average; BMI management plan is completed. We discussed portion control and increasing exercise.          Screening for cervical cancer    Current Assessment & Plan   Pap smear obtained today         Relevant Orders    IGP, Rfx Aptima HPV ASCU    RESOLVED: Situational stress    Current Assessment & Plan   Resolved thus delete            RESOLVED: Spider veins    Current Assessment & Plan   Normal for age thus delete         Visit for screening mammogram    Current Assessment & Plan   Patient given order for mammogram         Relevant Orders    Mammo Screening Digital Tomosynthesis Bilateral With CAD     Other Visit Diagnoses         Screening for cardiovascular condition        Relevant Orders    CT Cardiac Calcium Score Without Dye    Vascular Screening (Bundle) CAR               Encouraged to check her skin and breasts monthly. Reviewed exercising regularly, eating a balanced diet, having regular mammograms, immunizations and if due, patient counselled to check with insurance company for coverage;, importance of bone density screening, and regularly checking skin and breasts.

## 2025-03-17 NOTE — ASSESSMENT & PLAN NOTE
Lipid and CMP done 1-, results reviewed with pt.   Trig. 108 up from 88, Tot. Chol. 213 up from 203, HDL 77 down from 79,  up [id number]  Worsening   Encouraged to watch fatty intake, exercise more, and lose weight.   No medication    Is not getting adequate diet and exercise  Goals developed at last visit were not met  Follow up in 12 months  Care management needs are self-addressed. Self-management abilities addressed and patient is capable of managing her own disease.

## 2025-03-18 PROBLEM — N60.19 FIBROCYSTIC BREAST DISEASE: Status: RESOLVED | Noted: 2019-11-12 | Resolved: 2025-03-18

## 2025-03-18 PROBLEM — H40.10X0 OPEN-ANGLE GLAUCOMA: Status: RESOLVED | Noted: 2020-02-21 | Resolved: 2025-03-18

## 2025-03-18 PROBLEM — K59.00 CONSTIPATION: Status: RESOLVED | Noted: 2019-11-12 | Resolved: 2025-03-18

## 2025-03-18 PROBLEM — R21 ERYTHEMATOUS RASH: Status: RESOLVED | Noted: 2024-03-14 | Resolved: 2025-03-18

## 2025-03-18 NOTE — ASSESSMENT & PLAN NOTE
Patient's (Body mass index is 28.59 kg/m².) indicates that they are overweight with health conditions that include dyslipidemias . Weight is improving with lifestyle modifications. BMI is above average; BMI management plan is completed. We discussed portion control and increasing exercise.

## 2025-03-25 ENCOUNTER — TELEPHONE (OUTPATIENT)
Dept: FAMILY MEDICINE CLINIC | Facility: CLINIC | Age: 60
End: 2025-03-25
Payer: COMMERCIAL

## 2025-03-25 LAB
CONV .: ABNORMAL
CYTOLOGIST CVX/VAG CYTO: ABNORMAL
CYTOLOGY CVX/VAG DOC CYTO: ABNORMAL
CYTOLOGY CVX/VAG DOC THIN PREP: ABNORMAL
DX ICD CODE: ABNORMAL
DX ICD CODE: ABNORMAL
HPV I/H RISK 4 DNA CVX QL PROBE+SIG AMP: NEGATIVE
OTHER STN SPEC: ABNORMAL
PATHOLOGIST CVX/VAG CYTO: ABNORMAL
RECOM F/U CVX/VAG CYTO: ABNORMAL
SERVICE CMNT-IMP: ABNORMAL
STAT OF ADQ CVX/VAG CYTO-IMP: ABNORMAL

## 2025-03-25 NOTE — TELEPHONE ENCOUNTER
Advised patient the Pap smear showed ASCUS but HPV is negative.  I encouraged her to repeat a Pap smear next year

## 2025-04-25 LAB
NCCN CRITERIA FLAG: NORMAL
TYRER CUZICK SCORE: 5.3

## 2025-04-30 ENCOUNTER — APPOINTMENT (OUTPATIENT)
Dept: BONE DENSITY | Facility: HOSPITAL | Age: 60
End: 2025-04-30
Payer: COMMERCIAL

## 2025-04-30 ENCOUNTER — HOSPITAL ENCOUNTER (OUTPATIENT)
Dept: CT IMAGING | Facility: HOSPITAL | Age: 60
Discharge: HOME | End: 2025-04-30

## 2025-04-30 ENCOUNTER — HOSPITAL ENCOUNTER (OUTPATIENT)
Dept: MAMMOGRAPHY | Facility: HOSPITAL | Age: 60
Discharge: HOME | End: 2025-04-30
Admitting: FAMILY MEDICINE
Payer: COMMERCIAL

## 2025-04-30 ENCOUNTER — HOSPITAL ENCOUNTER (OUTPATIENT)
Dept: ULTRASOUND IMAGING | Facility: HOSPITAL | Age: 60
Discharge: HOME | End: 2025-04-30

## 2025-04-30 LAB
BH CV VAS SCREENING CAROTID CCA LEFT: 74 CM/SEC
BH CV VAS SCREENING CAROTID CCA RIGHT: 89 CM/SEC
BH CV VAS SCREENING CAROTID ICA LEFT: 79 CM/SEC
BH CV VAS SCREENING CAROTID ICA RIGHT: 84 CM/SEC
BH CV XLRA MEAS - MID AO DIAM: 1.79 CM
BH CV XLRA MEAS - PAD LEFT ABI PT: 1.13
BH CV XLRA MEAS - PAD LEFT ARM: 125 MMHG
BH CV XLRA MEAS - PAD LEFT LEG PT: 152 MMHG
BH CV XLRA MEAS - PAD RIGHT ABI PT: 1.15
BH CV XLRA MEAS - PAD RIGHT ARM: 135 MMHG
BH CV XLRA MEAS - PAD RIGHT LEG PT: 155 MMHG
BH CV XLRA MEAS LEFT DIST CCA EDV: 23.2 CM/SEC
BH CV XLRA MEAS LEFT DIST CCA PSV: 74.4 CM/SEC
BH CV XLRA MEAS LEFT ICA/CCA RATIO: 1.07
BH CV XLRA MEAS LEFT PROX ICA EDV: 27.4 CM/SEC
BH CV XLRA MEAS LEFT PROX ICA PSV: 79.1 CM/SEC
BH CV XLRA MEAS RIGHT DIST CCA EDV: 27.4 CM/SEC
BH CV XLRA MEAS RIGHT DIST CCA PSV: 89.3 CM/SEC
BH CV XLRA MEAS RIGHT ICA/CCA RATIO: 0.94
BH CV XLRA MEAS RIGHT PROX ICA EDV: 25.6 CM/SEC
BH CV XLRA MEAS RIGHT PROX ICA PSV: 84.5 CM/SEC

## 2025-04-30 PROCEDURE — 77067 SCR MAMMO BI INCL CAD: CPT

## 2025-04-30 PROCEDURE — 75571 CT HRT W/O DYE W/CA TEST: CPT

## 2025-04-30 PROCEDURE — VASCULARSCN2 VASCULAR SCREENING (BUNDLE) CAR: Performed by: INTERNAL MEDICINE

## 2025-04-30 PROCEDURE — 77063 BREAST TOMOSYNTHESIS BI: CPT

## 2025-04-30 PROCEDURE — 93799 UNLISTED CV SVC/PROCEDURE: CPT
